# Patient Record
Sex: MALE | Race: BLACK OR AFRICAN AMERICAN | Employment: OTHER | URBAN - METROPOLITAN AREA
[De-identification: names, ages, dates, MRNs, and addresses within clinical notes are randomized per-mention and may not be internally consistent; named-entity substitution may affect disease eponyms.]

---

## 2017-03-11 RX ORDER — DULAGLUTIDE 1.5 MG/.5ML
INJECTION, SOLUTION SUBCUTANEOUS
Qty: 6 ML | Refills: 2 | Status: SHIPPED | OUTPATIENT
Start: 2017-03-11 | End: 2017-11-27 | Stop reason: SDUPTHER

## 2017-03-23 ENCOUNTER — OFFICE VISIT (OUTPATIENT)
Dept: ENDOCRINOLOGY | Age: 62
End: 2017-03-23

## 2017-03-23 VITALS
HEIGHT: 67 IN | DIASTOLIC BLOOD PRESSURE: 71 MMHG | WEIGHT: 217 LBS | BODY MASS INDEX: 34.06 KG/M2 | HEART RATE: 87 BPM | SYSTOLIC BLOOD PRESSURE: 106 MMHG

## 2017-03-23 DIAGNOSIS — E78.2 MIXED HYPERLIPIDEMIA: ICD-10-CM

## 2017-03-23 DIAGNOSIS — I10 ESSENTIAL HYPERTENSION: ICD-10-CM

## 2017-03-23 DIAGNOSIS — E11.21 TYPE 2 DIABETES MELLITUS WITH DIABETIC NEPHROPATHY, WITHOUT LONG-TERM CURRENT USE OF INSULIN (HCC): Primary | ICD-10-CM

## 2017-03-23 LAB — HBA1C MFR BLD HPLC: 7.9 %

## 2017-03-23 RX ORDER — GLIPIZIDE 5 MG/1
5 TABLET ORAL
Qty: 90 TAB | Refills: 3 | Status: SHIPPED | OUTPATIENT
Start: 2017-03-23 | End: 2018-03-05 | Stop reason: SDUPTHER

## 2017-03-23 NOTE — MR AVS SNAPSHOT
Visit Information Date & Time Provider Department Dept. Phone Encounter #  
 3/23/2017  4:10 PM Lexi Andrews, [place] [address] Diabetes and Endocrinology [phone] [id number] Follow-up Instructions Return in about 3 months (around 6/23/2017). Upcoming Health Maintenance Date Due Hepatitis C Screening 1955 Pneumococcal 19-64 Medium Risk (1 of 1 - PPSV23) 10/29/1972 DTaP/Tdap/Td series (1 - Tdap) 10/29/1974 FOBT Q 1 YEAR AGE 50-75 10/29/2003 ZOSTER VACCINE AGE 60> 10/29/2013 INFLUENZA AGE 9 TO ADULT 8/1/2016 LIPID PANEL Q1 3/9/2017 HEMOGLOBIN A1C Q6M 3/13/2017 MICROALBUMIN Q1 6/16/2017 EYE EXAM RETINAL OR DILATED Q1 8/2/2017 FOOT EXAM Q1 12/19/2017 Allergies as of 3/23/2017  Review Complete On: 3/23/2017 By: Lexi Andrews MD  
  
 Severity Noted Reaction Type Reactions Penicillins  03/19/2015    Hives Current Immunizations  Never Reviewed No immunizations on file. Not reviewed this visit You Were Diagnosed With   
  
 Codes Comments Type 2 diabetes mellitus with diabetic nephropathy, without long-term current use of insulin (HCC)    -  Primary ICD-10-CM: E11.21 
ICD-9-CM: 250.40, 583.81 Essential hypertension     ICD-10-CM: I10 
ICD-9-CM: 401.9 Mixed hyperlipidemia     ICD-10-CM: E78.2 ICD-9-CM: 272.2 Vitals BP Pulse Height(growth percentile) Weight(growth percentile) BMI Smoking Status 106/71 87 5' 7\" (1.702 m) 217 lb (98.4 kg) 33.99 kg/m2 Never Smoker Vitals History BMI and BSA Data Body Mass Index Body Surface Area  
 33.99 kg/m 2 2.16 m 2 Preferred Pharmacy Pharmacy Name Phone Kiara Math[facility] [phone] Your Updated Medication List  
  
   
This list is accurate as of: 3/23/17  4:28 PM.  Always use your most recent med list.  
  
  
  
  
 aspirin 325 mg tablet Commonly known as:  ASPIRIN  
 Take 325 mg by mouth daily. BENICAR 20 mg tablet Generic drug:  olmesartan Take 1 Tab by mouth daily. For BP Blood-Glucose Meter Misc Commonly known as:  Meche Herder IQ METER Check blood sugar 3 times daily. E11.21  
  
 CINNAMON 500 mg Cap Generic drug:  cinnamon bark Take  by mouth two (2) times a day. glucose blood VI test strips strip Commonly known as:  CONTOUR NEXT STRIPS Check blood sugar 3 times daily JARDIANCE 10 mg tablet Generic drug:  empagliflozin TAKE 1 TABLET BY MOUTH DAILY  
  
 metFORMIN  mg tablet Commonly known as:  GLUCOPHAGE XR Take 2 Tabs by mouth two (2) times a day. For diabetes  
  
 mometasone 0.1 % topical cream  
Commonly known as:  ELOCON  
  
 multivitamin tablet Commonly known as:  ONE A DAY Take 1 Tab by mouth daily. OMEGA 3-6-9 PO Take  by mouth two (2) times a day. pioglitazone 30 mg tablet Commonly known as:  ACTOS  
1 tablet daily for diabetes  
  
 simvastatin 20 mg tablet Commonly known as:  ZOCOR Take 1 Tab by mouth nightly. For cholesterol TRULICITY 1.5 IB/0.7 mL sub-q pen Generic drug:  dulaglutide INJECT 0.5 ML (1 PEN) UNDER THE SKIN EVERY 7 DAYS  
  
 VIAGRA 100 mg tablet Generic drug:  sildenafil citrate We Performed the Following AMB POC HEMOGLOBIN A1C [74355 CPT(R)] HM DIABETES FOOT EXAM [[facility] Custom] Follow-up Instructions Return in about 3 months (around 6/23/2017). Patient Instructions 1) Stop the CASA AMISTAD 2) Start Glipizide 5mg with Dinner. 3) Continue the Metformin two pills with breakfast and 2 pills with dinner 4) Continue the Trulicity 5.8ER every week. 5) Continue the Actos 30mg daily. Introducing [facility]! Dear Neftali Estrella: Thank you for requesting a [employer] account. Our records indicate that you already have an active [employer] account.   You can access your account anytime at [email or web address] Did you know that you can access your hospital and ER discharge instructions at any time in [employer]? You can also review all of your test results from your hospital stay or ER visit. Additional Information If you have questions, please visit the Frequently Asked Questions section of the [employer] website at [email or web address] Remember, [employer] is NOT to be used for urgent needs. For medical emergencies, dial 911. Now available from your iPhone and Android! Please provide this summary of care documentation to your next provider. Your primary care clinician is listed as Danny Grigsby. If you have any questions after today's visit, please call [phone].

## 2017-03-23 NOTE — PROGRESS NOTES
Chief Complaint   Patient presents with    Diabetes     pcp and pharmacy verified. Eye exam UTD. History of Present Illness: Ryley Adair is a 59 y.o. male here for follow up of diabetes. He was diagnosed with diabetes around 2005. At his last visit in December 2016 his A1C was 6.7% on Metformin XR 1000mg BID, Actos 30mg HS and Jardiance 10mg daily and and Trulicity 7.2BO weekly. He was encouraged to keep up the good work. Pt notes he is still having issues of bad breath and he \"keeps a mint in his mouth. \"    His A1C today was 7.9%. Pt note that his BGs have been averaging in the 130's. Pt is taking Metformin XR 1000mg BID, Actos 30mg HS, Jardiance 5mg daily and Trulicity 8.0GT weekly. He notes that he has cut the Jardiance in half to 5mg because \"It has be running to the bathroom all day\". He has not been having any issues of hypoglycemia. He notes he has been eating more pasterys and ice cream over the last couple of months. He notes the congestion at night his been better. He uses a saline spray at bedtime and has stopped the Afrin. He notes that he still has halitosis. He saw his dentis who told him \"there was nothing wrong\". Pt is eating 3 meals daily  He has breakfast around 7AM, today he had ham and egg sandwich coffee (black with sugar). He does not eat breakfast every day. He has lunch around Noon-1PM, today he had a cheese burger and some fries and water  He has dinner around 6-7PM, last night he had fried fish with fries and broccoli and water. His largest meal of the day is dinner. Exercise consists of very little. Pt is self employed as a . He and his wife walk in the evening when the weather is good. No history of vascular disease. No history of retinopathy or neuropathy, but does have nephropathy (history of positive microalbuminuria and CKD). Last eye exam was August 2016, no retinopathy, (records reviewed).    Pt denies any issues of numbness or burning in his feet. Pt has been to DM education classes in the past.    His lipid panel was at goal in March 2016 and he has been tolerating his regimen of Simvastatin 20mg daily with no issues. Current Outpatient Prescriptions   Medication Sig    glipiZIDE (GLUCOTROL) 5 mg tablet Take 1 Tab by mouth Daily (before dinner).  TRULICITY 1.5 UK/1.5 mL sub-q pen INJECT 0.5 ML (1 PEN) UNDER THE SKIN EVERY 7 DAYS    mometasone (ELOCON) 0.1 % topical cream     BENICAR 20 mg tablet Take 1 Tab by mouth daily. For BP (Patient taking differently: Take 10 mg by mouth daily. For BP)    simvastatin (ZOCOR) 20 mg tablet Take 1 Tab by mouth nightly. For cholesterol    metFORMIN ER (GLUCOPHAGE XR) 500 mg tablet Take 2 Tabs by mouth two (2) times a day. For diabetes    pioglitazone (ACTOS) 30 mg tablet 1 tablet daily for diabetes    VIAGRA 100 mg tablet     Blood-Glucose Meter (ONETOUCH VERIO IQ METER) misc Check blood sugar 3 times daily. E11.21    glucose blood VI test strips (CONTOUR NEXT STRIPS) strip Check blood sugar 3 times daily    multivitamin (ONE A DAY) tablet Take 1 Tab by mouth daily.  OM-3/E/LINOL/ALA/OLEIC/GLA/LIP (OMEGA 3-6-9 PO) Take  by mouth two (2) times a day.  cinnamon bark (CINNAMON) 500 mg cap Take  by mouth two (2) times a day.  aspirin (ASPIRIN) 325 mg tablet Take 325 mg by mouth daily. No current facility-administered medications for this visit. Allergies   Allergen Reactions    Jardiance [Empagliflozin] Other (comments)     Polyuria    Penicillins Hives     Review of Systems:  - Eyes: no blurry vision or double vision  - Cardiovascular: no chest pain  - Respiratory: no shortness of breath  - Musculoskeletal: no myalgias  - Neurological: no numbness/tingling in extremities    Physical Examination:  Blood pressure 106/71, pulse 87, height 5' 7\" (1.702 m), weight 217 lb (98.4 kg).   - General: pleasant, no distress, good eye contact   - Neck: no carotid bruits  - Cardiovascular: regular, normal rate, nl s1 and s2, no m/r/g, 2+ DP pulses   - Respiratory: clear bilaterally  - Integumentary: no edema, no foot ulcers, sensation to monofilament and vibration intact bilaterally  - Psychiatric: normal mood and affect    Data Reviewed:   - His A1C today was 7.9%    Assessment/Plan:   1) DM > His A1C today was 7.9%. Pt notes he has been eating more baked goods and sweets. He also notes he is not doing well with the Jardiance, as he has been having to go to the restroom frequently. Will stop the Jardiance and start pt on Glipizide 5mg with dinner only. Will continue the Metformin, Actos and Trulicity. 2) HTN > His BP is at goal on his current regimen and he is on an ARB for renal protection    3) HLD > His lipid panel in March 2016 was at goal. Pt is tolerating his Simvastatin well. RTC 3 months    Pt voices understanding and agreement with the plan. Follow-up Disposition:  Return in about 3 months (around 6/23/2017).     Copy sent to:  Dr. Mandy Gary

## 2017-03-23 NOTE — PATIENT INSTRUCTIONS
1) Stop the Jardance  2) Start Glipizide 5mg with Dinner. 3) Continue the Metformin two pills with breakfast and 2 pills with dinner  4) Continue the Trulicity 3.4TW every week. 5) Continue the Actos 30mg daily.

## 2017-03-24 ENCOUNTER — TELEPHONE (OUTPATIENT)
Dept: ENDOCRINOLOGY | Age: 62
End: 2017-03-24

## 2017-07-10 RX ORDER — PIOGLITAZONEHYDROCHLORIDE 30 MG/1
TABLET ORAL
Qty: 90 TAB | Refills: 2 | Status: SHIPPED | OUTPATIENT
Start: 2017-07-10 | End: 2018-04-15 | Stop reason: SDUPTHER

## 2017-07-10 RX ORDER — BLOOD-GLUCOSE METER
EACH MISCELLANEOUS
Qty: 1 EACH | Refills: 0 | Status: SHIPPED | OUTPATIENT
Start: 2017-07-10

## 2017-07-13 RX ORDER — OLMESARTAN MEDOXOMIL 20 MG/1
20 TABLET ORAL DAILY
Qty: 90 TAB | Refills: 1
Start: 2017-07-13 | End: 2019-01-11 | Stop reason: SDUPTHER

## 2017-07-31 ENCOUNTER — OFFICE VISIT (OUTPATIENT)
Dept: ENDOCRINOLOGY | Age: 62
End: 2017-07-31

## 2017-07-31 VITALS
BODY MASS INDEX: 36.16 KG/M2 | DIASTOLIC BLOOD PRESSURE: 83 MMHG | SYSTOLIC BLOOD PRESSURE: 119 MMHG | HEIGHT: 66 IN | HEART RATE: 83 BPM | WEIGHT: 225 LBS

## 2017-07-31 DIAGNOSIS — E78.2 MIXED HYPERLIPIDEMIA: ICD-10-CM

## 2017-07-31 DIAGNOSIS — I10 ESSENTIAL HYPERTENSION: ICD-10-CM

## 2017-07-31 DIAGNOSIS — E11.21 TYPE 2 DIABETES MELLITUS WITH DIABETIC NEPHROPATHY, WITHOUT LONG-TERM CURRENT USE OF INSULIN (HCC): Primary | ICD-10-CM

## 2017-07-31 NOTE — PROGRESS NOTES
No chief complaint on file. History of Present Illness: Leonel Anguiano is a 59 y.o. male here for follow up of diabetes. He was diagnosed with diabetes around 2005. At his last visit in April 2017 his A1C was 7.9% on Metformin XR 1000mg BID, Actos 30mg HS and Jardiance 10mg daily and and Trulicity 0.3QI weekly. He was encouraged to keep up the good work. He was having polyuria and wanted to stop the Jardiance so we stopped that and started him on Glipizide 5mg with dinner only. Pt notes his PCP started him on a PPI and it has helped with her GERD and the bad breath issue. Pt has not been checking his BGs. Pt is taking Metformin XR 1000mg BID, Actos 30mg HS, Glipizide 5mg with dinner and Trulicity 1.4ZD weekly. Pt is eating 3 meals daily  He has breakfast around 7AM, today he had ham and egg sandwich coffee (black with sugar) and 2 apple pies. He has lunch around Noon-1PM, today he had ham and turkey sandwich, no side items and water. He has dinner around 6-7PM, last night he had a hamburger, french fries and water. His largest meal of the day is dinner. Exercise consists of very little. Pt is self employed as a . He and his wife walk in the evening when the weather is good. No history of vascular disease. No history of retinopathy or neuropathy, but does have nephropathy (history of positive microalbuminuria and CKD). Last eye exam was August 2016, no retinopathy, (records reviewed). Pt denies any issues of numbness or burning in his feet. His lipid panel was at goal in March 2016 and he has been tolerating his regimen of Simvastatin 20mg daily with no issues. Current Outpatient Prescriptions   Medication Sig    metFORMIN ER (GLUCOPHAGE XR) 500 mg tablet TAKE 2 TABLETS TWICE A DAY FOR DIABETES    simvastatin (ZOCOR) 20 mg tablet TAKE 1 TABLET NIGHTLY FOR CHOLESTEROL    olmesartan (BENICAR) 20 mg tablet Take 1 Tab by mouth daily.  (replaces brand Benicar)   Chyna Perkins VERIO IQ METER misc USE TO CHECK BLOOD SUGAR THREE TIMES A DAY    pioglitazone (ACTOS) 30 mg tablet TAKE 1 TABLET DAILY FOR DIABETES    glipiZIDE (GLUCOTROL) 5 mg tablet Take 1 Tab by mouth Daily (before dinner).  TRULICITY 1.5 VD/8.0 mL sub-q pen INJECT 0.5 ML (1 PEN) UNDER THE SKIN EVERY 7 DAYS    mometasone (ELOCON) 0.1 % topical cream     VIAGRA 100 mg tablet     glucose blood VI test strips (CONTOUR NEXT STRIPS) strip Check blood sugar 3 times daily    multivitamin (ONE A DAY) tablet Take 1 Tab by mouth daily.  OM-3/E/LINOL/ALA/OLEIC/GLA/LIP (OMEGA 3-6-9 PO) Take  by mouth two (2) times a day.  cinnamon bark (CINNAMON) 500 mg cap Take  by mouth two (2) times a day.  aspirin (ASPIRIN) 325 mg tablet Take 325 mg by mouth daily. No current facility-administered medications for this visit. Allergies   Allergen Reactions    Jardiance [Empagliflozin] Other (comments)     Polyuria    Penicillins Hives     Review of Systems:  - Eyes: no blurry vision or double vision  - Cardiovascular: no chest pain  - Respiratory: no shortness of breath  - Musculoskeletal: no myalgias  - Neurological: no numbness/tingling in extremities    Physical Examination:  Blood pressure 119/83, pulse 83, height 5' 6\" (1.676 m), weight 225 lb (102.1 kg). - General: pleasant, no distress, good eye contact   - Neck: no carotid bruits  - Cardiovascular: regular, normal rate, nl s1 and s2, no m/r/g, 2+ DP pulses   - Respiratory: clear bilaterally  - Integumentary: no edema, no foot ulcers, sensation to monofilament and vibration intact bilaterally  - Psychiatric: normal mood and affect    Data Reviewed:   -none    Assessment/Plan:   1) DM > Will check an A1C today. Pt given copy of \"Daily Diabetes Meal Planning Guide\" and educated about the \"[employer] dinner plate\", reading food labels and which foods have the highest carbohydrates.   Pt instructed to limit her carbohydrates to 45-60 grams with meals and 15 grams or less with snacks (but to limit snacks). If his A1C is still above goal we may need to increase his Glipizide. 2) HTN > His BP is at goal on his current regimen and he is on an ARB for renal protection    3) HLD > His lipid panel in March 2016 was at goal. Pt is tolerating his Simvastatin well. Will check a CMP and lipid panel today. RTC 3 months    Pt voices understanding and agreement with the plan. Follow-up Disposition:  Return in about 3 months (around 10/31/2017).     Copy sent to:  Dr. Esther Jha

## 2017-08-01 LAB
ALBUMIN SERPL-MCNC: 4.1 G/DL (ref 3.6–4.8)
ALBUMIN/CREAT UR: 306.3 MG/G CREAT (ref 0–30)
ALBUMIN/GLOB SERPL: 1.6 {RATIO} (ref 1.2–2.2)
ALP SERPL-CCNC: 82 IU/L (ref 39–117)
ALT SERPL-CCNC: 29 IU/L (ref 0–44)
AST SERPL-CCNC: 18 IU/L (ref 0–40)
BILIRUB SERPL-MCNC: 0.3 MG/DL (ref 0–1.2)
BUN SERPL-MCNC: 14 MG/DL (ref 8–27)
BUN/CREAT SERPL: 14 (ref 10–24)
CALCIUM SERPL-MCNC: 8.9 MG/DL (ref 8.6–10.2)
CHLORIDE SERPL-SCNC: 103 MMOL/L (ref 96–106)
CHOLEST SERPL-MCNC: 131 MG/DL (ref 100–199)
CO2 SERPL-SCNC: 25 MMOL/L (ref 18–29)
CREAT SERPL-MCNC: 1.02 MG/DL (ref 0.76–1.27)
CREAT UR-MCNC: 145.5 MG/DL
EST. AVERAGE GLUCOSE BLD GHB EST-MCNC: 174 MG/DL
GLOBULIN SER CALC-MCNC: 2.5 G/DL (ref 1.5–4.5)
GLUCOSE SERPL-MCNC: 140 MG/DL (ref 65–99)
HBA1C MFR BLD: 7.7 % (ref 4.8–5.6)
HDLC SERPL-MCNC: 62 MG/DL
INTERPRETATION, 910389: NORMAL
LDLC SERPL CALC-MCNC: 49 MG/DL (ref 0–99)
LDLC SERPL DIRECT ASSAY-MCNC: 66 MG/DL (ref 0–99)
Lab: NORMAL
MICROALBUMIN UR-MCNC: 445.6 UG/ML
POTASSIUM SERPL-SCNC: 4.3 MMOL/L (ref 3.5–5.2)
PROT SERPL-MCNC: 6.6 G/DL (ref 6–8.5)
SODIUM SERPL-SCNC: 140 MMOL/L (ref 134–144)
TRIGL SERPL-MCNC: 99 MG/DL (ref 0–149)
VLDLC SERPL CALC-MCNC: 20 MG/DL (ref 5–40)

## 2017-08-01 NOTE — PROGRESS NOTES
Pt instructed to continue his Simvastatin 20mg daily. I will either increase his Glipizide to 5mg BID or restart the Jardiance 10mg daily. I will give pt the option.

## 2017-08-03 ENCOUNTER — HOSPITAL ENCOUNTER (OUTPATIENT)
Dept: GENERAL RADIOLOGY | Age: 62
Discharge: HOME | End: 2017-08-03
Attending: SPECIALIST
Payer: COMMERCIAL

## 2017-08-03 DIAGNOSIS — K44.9 DIAPHRAGMATIC HERNIA WITHOUT MENTION OF OBSTRUCTION OR GANGRENE: ICD-10-CM

## 2017-08-03 DIAGNOSIS — R19.6 BAD BREATH: ICD-10-CM

## 2017-08-03 DIAGNOSIS — K21.9 ESOPHAGEAL REFLUX: ICD-10-CM

## 2017-08-03 PROCEDURE — 74220 X-RAY XM ESOPHAGUS 1CNTRST: CPT

## 2017-08-07 ENCOUNTER — TELEPHONE (OUTPATIENT)
Dept: ENDOCRINOLOGY | Age: 62
End: 2017-08-07

## 2017-08-07 NOTE — TELEPHONE ENCOUNTER
Labs reviewed,  A1c level is 7.7%, minimally changed from prior 7.9%. Cholesterol looks great, LDL cholesterol is 66. Urine protein is elevated at 306, may improve with good blood pressure and diabetes control. Kidney and liver function look stable. Thanks,  Avila Rea.  [address] [facility]  [address] [place] [address]

## 2017-08-07 NOTE — TELEPHONE ENCOUNTER
Patient's wife, Delilah Bishop, called to get her 's lab results. She can be reached at:  [phone].

## 2017-08-21 RX ORDER — BLOOD SUGAR DIAGNOSTIC
STRIP MISCELLANEOUS
Qty: 300 STRIP | Refills: 11 | Status: SHIPPED | OUTPATIENT
Start: 2017-08-21 | End: 2019-01-11 | Stop reason: SDUPTHER

## 2017-10-03 ENCOUNTER — HOSPITAL ENCOUNTER (OUTPATIENT)
Dept: NUCLEAR MEDICINE | Age: 62
Discharge: HOME | End: 2017-10-03
Attending: SPECIALIST
Payer: COMMERCIAL

## 2017-10-03 DIAGNOSIS — R19.6 BAD BREATH: ICD-10-CM

## 2017-10-03 DIAGNOSIS — K44.9 DIAPHRAGMATIC HERNIA: ICD-10-CM

## 2017-10-03 DIAGNOSIS — K21.9 ESOPHAGEAL REFLUX: ICD-10-CM

## 2017-10-03 PROCEDURE — 78264 GASTRIC EMPTYING IMG STUDY: CPT

## 2017-10-24 NOTE — TELEPHONE ENCOUNTER
Patient's wife, Lauryn Dave, called to ask if you can contact [facility] for a 90-day supply of Jardiance for [place]? Lauryn Dave can be reached at:  [phone].

## 2017-10-25 NOTE — TELEPHONE ENCOUNTER
Per OV 7/31/17: Pt instructed to continue his Simvastatin 20mg daily. I will either increase his Glipizide to 5mg BID or restart the Jardiance 10mg daily. I will give pt the option.  ----------  Rx sent for Jardiance 10 mg to [facility].

## 2017-11-22 ENCOUNTER — OFFICE VISIT (OUTPATIENT)
Dept: ENDOCRINOLOGY | Age: 62
End: 2017-11-22

## 2017-11-22 VITALS
SYSTOLIC BLOOD PRESSURE: 130 MMHG | WEIGHT: 221.1 LBS | DIASTOLIC BLOOD PRESSURE: 88 MMHG | HEIGHT: 66 IN | HEART RATE: 88 BPM | BODY MASS INDEX: 35.53 KG/M2

## 2017-11-22 DIAGNOSIS — E78.2 MIXED HYPERLIPIDEMIA: ICD-10-CM

## 2017-11-22 DIAGNOSIS — I10 ESSENTIAL HYPERTENSION: ICD-10-CM

## 2017-11-22 DIAGNOSIS — E11.21 TYPE 2 DIABETES MELLITUS WITH DIABETIC NEPHROPATHY, WITHOUT LONG-TERM CURRENT USE OF INSULIN (HCC): Primary | ICD-10-CM

## 2017-11-22 RX ORDER — LANSOPRAZOLE 30 MG/1
CAPSULE, DELAYED RELEASE ORAL DAILY
COMMUNITY
Start: 2017-11-15

## 2017-11-22 NOTE — PROGRESS NOTES
Chief Complaint   Patient presents with    Diabetes     pcp and pharmacy verified     History of Present Illness: Yao Blackwell is a 57 y.o. male here for follow up of diabetes. He was diagnosed with diabetes around 2005. At his last visit in July 2017 his A1C was 7.7% on Metformin XR 1000mg BID, Actos 30mg HS Glipizide 5mg with dinner and Trulicity 5.3KM weekly. I recommended he retry the Jardiance 10mg daily and continue his other regimen. Pt notes that his BGs tend to run in the 150's range. He notes that he has stopped using sugar in his coffee. Pt is taking Metformin XR 1000mg BID, Actos 30mg HS, Glipizide 5mg with dinner and Trulicity 7.7AD weekly. Pt is eating 3 meals daily  He has breakfast around 630AM, today he had ham sandwich and coffee (black with no sugar). He will occasionally have a snack in the mid-morning. He has lunch around Noon-1PM, today he had a PB&J sandwich no side items and water, then he had two pieces of chicken when he got home. He has dinner around 6-7PM, last night he had chicken and butter beans and water. His largest meal of the day is dinner. Exercise consists of very little. Pt is self employed as a . He and his wife walk in the evening when the weather is good. He is not walking as much with the cold weather. No history of vascular disease. No history of retinopathy or neuropathy, but does have nephropathy (history of positive microalbuminuria and CKD). Last eye exam was August 2017, no retinopathy, (records reviewed). Pt denies any issues of numbness or burning in his feet. Pt saw Dr. Ignacio Clemente of GI, he had EGD and colonoscopy and gastric emptying stomach. He was found to have slow emptying of his stomach (gastroparesis) and a hiatal hernia. He was instructed to sleep with a wedge pillow and was started on Prevacid. Since that time he has been having less GERD.      His lipid panel was at goal in July 2017 and he has been tolerating his regimen of Simvastatin 20mg daily with no issues. Current Outpatient Prescriptions   Medication Sig    ONETOUCH VERIO strip CHECK BLOOD SUGAR THREE TIMES A DAY    metFORMIN ER (GLUCOPHAGE XR) 500 mg tablet TAKE 2 TABLETS TWICE A DAY FOR DIABETES    simvastatin (ZOCOR) 20 mg tablet TAKE 1 TABLET NIGHTLY FOR CHOLESTEROL    olmesartan (BENICAR) 20 mg tablet Take 1 Tab by mouth daily. (replaces brand Benicar) (Patient taking differently: Take 10 mg by mouth daily. (replaces brand Benicar))    ONETOUCH VERIO IQ METER misc USE TO CHECK BLOOD SUGAR THREE TIMES A DAY    pioglitazone (ACTOS) 30 mg tablet TAKE 1 TABLET DAILY FOR DIABETES    glipiZIDE (GLUCOTROL) 5 mg tablet Take 1 Tab by mouth Daily (before dinner).  TRULICITY 1.5 XD/1.9 mL sub-q pen INJECT 0.5 ML (1 PEN) UNDER THE SKIN EVERY 7 DAYS    VIAGRA 100 mg tablet     multivitamin (ONE A DAY) tablet Take 1 Tab by mouth daily.  OM-3/E/LINOL/ALA/OLEIC/GLA/LIP (OMEGA 3-6-9 PO) Take  by mouth two (2) times a day.  cinnamon bark (CINNAMON) 500 mg cap Take  by mouth two (2) times a day.  lansoprazole (PREVACID) 30 mg capsule     mometasone (ELOCON) 0.1 % topical cream      No current facility-administered medications for this visit. Allergies   Allergen Reactions    Jardiance [Empagliflozin] Other (comments)     Polyuria    Penicillins Hives     Review of Systems:  - Eyes: no blurry vision or double vision  - Cardiovascular: no chest pain  - Respiratory: no shortness of breath  - Musculoskeletal: no myalgias  - Neurological: no numbness/tingling in extremities    Physical Examination:  Blood pressure 130/88, pulse 88, height 5' 6\" (1.676 m), weight 221 lb 1.6 oz (100.3 kg).   - General: pleasant, no distress, good eye contact   - Neck: no carotid bruits  - Cardiovascular: regular, normal rate, nl s1 and s2, no m/r/g, 2+ DP pulses   - Respiratory: clear bilaterally  - Integumentary: no edema, no foot ulcers, sensation to monofilament and vibration intact bilaterally  - Psychiatric: normal mood and affect    Data Reviewed:   -none    Assessment/Plan:   1) DM > Pt reports that since he stopped using sugar in his coffee and note eating as many sweets, his BGs have been better. Will check an A1C today. If his A1C is still above goal we may need to increase his Glipizide. 2) HTN > His BP is at goal on his current regimen and he is on an ARB for renal protection    3) HLD > His lipid panel in July 2017 was ago goal. Pt is tolerating his Simvastatin well. RTC 3 months    Pt voices understanding and agreement with the plan. We spent 40 minutes of face to face time together and > 50% of the time was spent in counseling on the above issues. Follow-up Disposition:  Return in about 3 months (around 2/22/2018).     Copy sent to:  Dr. Guy Cain

## 2017-11-23 LAB
ALBUMIN SERPL-MCNC: 4.1 G/DL (ref 3.6–4.8)
BUN SERPL-MCNC: 14 MG/DL (ref 8–27)
BUN/CREAT SERPL: 11 (ref 10–24)
CALCIUM SERPL-MCNC: 9.2 MG/DL (ref 8.6–10.2)
CHLORIDE SERPL-SCNC: 103 MMOL/L (ref 96–106)
CO2 SERPL-SCNC: 24 MMOL/L (ref 18–29)
CREAT SERPL-MCNC: 1.23 MG/DL (ref 0.76–1.27)
EST. AVERAGE GLUCOSE BLD GHB EST-MCNC: 183 MG/DL
GFR SERPLBLD CREATININE-BSD FMLA CKD-EPI: 62 ML/MIN/1.73
GFR SERPLBLD CREATININE-BSD FMLA CKD-EPI: 71 ML/MIN/1.73
GLUCOSE SERPL-MCNC: 122 MG/DL (ref 65–99)
HBA1C MFR BLD: 8 % (ref 4.8–5.6)
PHOSPHATE SERPL-MCNC: 3 MG/DL (ref 2.5–4.5)
POTASSIUM SERPL-SCNC: 4.2 MMOL/L (ref 3.5–5.2)
SODIUM SERPL-SCNC: 142 MMOL/L (ref 134–144)

## 2017-11-28 RX ORDER — DULAGLUTIDE 1.5 MG/.5ML
INJECTION, SOLUTION SUBCUTANEOUS
Qty: 6 ML | Refills: 3 | Status: SHIPPED | OUTPATIENT
Start: 2017-11-28 | End: 2018-10-26 | Stop reason: SDUPTHER

## 2017-12-08 ENCOUNTER — TELEPHONE (OUTPATIENT)
Dept: ENDOCRINOLOGY | Age: 62
End: 2017-12-08

## 2017-12-08 NOTE — TELEPHONE ENCOUNTER
Patient has been read the [facility] message from 11/22/17. He expressed understanding. He states that he will send his BS readings in 3 weeks.

## 2018-03-01 ENCOUNTER — OFFICE VISIT (OUTPATIENT)
Dept: ENDOCRINOLOGY | Age: 63
End: 2018-03-01

## 2018-03-01 VITALS
HEIGHT: 66 IN | RESPIRATION RATE: 14 BRPM | DIASTOLIC BLOOD PRESSURE: 82 MMHG | SYSTOLIC BLOOD PRESSURE: 110 MMHG | OXYGEN SATURATION: 97 % | HEART RATE: 96 BPM | BODY MASS INDEX: 35.17 KG/M2 | WEIGHT: 218.8 LBS

## 2018-03-01 DIAGNOSIS — E78.2 MIXED HYPERLIPIDEMIA: ICD-10-CM

## 2018-03-01 DIAGNOSIS — I10 ESSENTIAL HYPERTENSION: ICD-10-CM

## 2018-03-01 DIAGNOSIS — E11.21 TYPE 2 DIABETES MELLITUS WITH DIABETIC NEPHROPATHY, WITHOUT LONG-TERM CURRENT USE OF INSULIN (HCC): Primary | ICD-10-CM

## 2018-03-01 NOTE — PROGRESS NOTES
Chief Complaint   Patient presents with    Diabetes     Follow-up     History of Present Illness: Jean-Pierre Peña is a 57 y.o. male here for follow up of diabetes. He was diagnosed with diabetes around 2005. At his last visit in November 2017 his A1C was 8.0% on Metformin XR 1000mg BID, Actos 30mg HS Glipizide 5mg with dinner and Trulicity 7.3HE weekly. Pt was to send me some BG readings, in 2-3 weeks, but he did not. \"I have been bad\". His wife notes he has been snacking more. Pt is currently taking Metformin XR 1000mg BID, Actos 30mg HS, Glipizide 5mg with dinner and Trulicity 9.8WR weekly. He is checking his BGs once per week, in the morning. He notes his BGs are running in the 140's. Pt is eating 3 meals daily  He has breakfast around 630AM, today he had a PB&J sandwich. He will occasionally have a snack in the mid-morning. He has lunch around Noon-1PM, today he had a chicken sandwich. He has dinner around 6-7PM, last night he had chicken and butter beans and water. His largest meal of the day is dinner. His wife is eating ice cream and cookies in the evening. Exercise consists of very little. Pt is self employed as a . He and his wife walk in the evening when the weather is good. He is not walking as much with the cold weather. No history of vascular disease. No history of retinopathy or neuropathy, but does have nephropathy (history of positive microalbuminuria and CKD). Last eye exam was August 2017, no retinopathy, (records reviewed). Pt denies any issues of numbness or burning in his feet. Pt saw Dr. Lola Barnett of GI, he had EGD and colonoscopy and gastric emptying stomach. He was found to have slow emptying of his stomach (gastroparesis) and a hiatal hernia. He was instructed to sleep with a wedge pillow and was started on Prevacid. Since that time he has been having less GERD.      His lipid panel was at goal in July 2017 and he has been tolerating his regimen of Simvastatin 20mg daily with no issues. Current Outpatient Prescriptions   Medication Sig    TRULICITY 1.5 ZC/5.3 mL sub-q pen INJECT 0.5 ML (1 PEN) UNDER THE SKIN EVERY 7 DAYS    lansoprazole (PREVACID) 30 mg capsule     ONETOUCH VERIO strip CHECK BLOOD SUGAR THREE TIMES A DAY    metFORMIN ER (GLUCOPHAGE XR) 500 mg tablet TAKE 2 TABLETS TWICE A DAY FOR DIABETES    simvastatin (ZOCOR) 20 mg tablet TAKE 1 TABLET NIGHTLY FOR CHOLESTEROL    olmesartan (BENICAR) 20 mg tablet Take 1 Tab by mouth daily. (replaces brand Benicar) (Patient taking differently: Take 10 mg by mouth daily. (replaces brand Benicar))    ONETOUCH VERIO IQ METER misc USE TO CHECK BLOOD SUGAR THREE TIMES A DAY    pioglitazone (ACTOS) 30 mg tablet TAKE 1 TABLET DAILY FOR DIABETES    glipiZIDE (GLUCOTROL) 5 mg tablet Take 1 Tab by mouth Daily (before dinner).  mometasone (ELOCON) 0.1 % topical cream     VIAGRA 100 mg tablet     multivitamin (ONE A DAY) tablet Take 1 Tab by mouth daily.  OM-3/E/LINOL/ALA/OLEIC/GLA/LIP (OMEGA 3-6-9 PO) Take  by mouth two (2) times a day.  cinnamon bark (CINNAMON) 500 mg cap Take  by mouth two (2) times a day. No current facility-administered medications for this visit. Allergies   Allergen Reactions    Jardiance [Empagliflozin] Other (comments)     Polyuria    Penicillins Hives     Review of Systems:  - Eyes: no blurry vision or double vision  - Cardiovascular: no chest pain  - Respiratory: no shortness of breath  - Musculoskeletal: no myalgias  - Neurological: no numbness/tingling in extremities    Physical Examination:  Blood pressure 110/82, pulse 96, resp. rate 14, height 5' 6\" (1.676 m), weight 218 lb 12.8 oz (99.2 kg), SpO2 97 %.   - General: pleasant, no distress, good eye contact   - Neck: no carotid bruits  - Cardiovascular: regular, normal rate, nl s1 and s2, no m/r/g, 2+ DP pulses   - Respiratory: clear bilaterally  - Integumentary: no edema, no foot ulcers, sensation to monofilament and vibration intact bilaterally  - Psychiatric: normal mood and affect    Data Reviewed:   -none    Assessment/Plan:   1) DM > Pt's wife notes he has been snacking more and using sugar in his tea and coffee. Will check an A1C today. If his A1C is still above goal we may need to increase his Glipizide. Pt to check his BGs twice per day. 2) HTN > His BP is at goal on his current regimen and he is on an ARB for renal protection    3) HLD > His lipid panel in July 2017 was ago goal. Pt is tolerating his Simvastatin well. RTC 3 months    Pt voices understanding and agreement with the plan. Follow-up Disposition:  Return in about 3 months (around 6/1/2018).     Copy sent to:  Dr. Logan Dumont

## 2018-03-01 NOTE — PROGRESS NOTES
Herlinda Chavarria is a 57 y.o. male      Chief Complaint   Patient presents with    Diabetes     Follow-up       1. Have you been to the ER, urgent care clinic since your last visit? Hospitalized since your last visit? No    2. Have you seen or consulted any other health care providers outside of the [address] [place] [address] since your last visit? Include any pap smears or colon screening.  No

## 2018-03-02 LAB
EST. AVERAGE GLUCOSE BLD GHB EST-MCNC: 189 MG/DL
HBA1C MFR BLD: 8.2 % (ref 4.8–5.6)

## 2018-03-28 ENCOUNTER — TELEPHONE (OUTPATIENT)
Dept: ENDOCRINOLOGY | Age: 63
End: 2018-03-28

## 2018-03-29 NOTE — TELEPHONE ENCOUNTER
Spoke with patient. Advised patient about the unread MyChart message. Patient states that he will read the message.

## 2018-04-16 RX ORDER — METFORMIN HYDROCHLORIDE 500 MG/1
TABLET, EXTENDED RELEASE ORAL
Qty: 360 TAB | Refills: 2 | Status: SHIPPED | OUTPATIENT
Start: 2018-04-16 | End: 2018-12-14 | Stop reason: SDUPTHER

## 2018-04-16 RX ORDER — PIOGLITAZONEHYDROCHLORIDE 30 MG/1
TABLET ORAL
Qty: 90 TAB | Refills: 2 | Status: SHIPPED | OUTPATIENT
Start: 2018-04-16 | End: 2018-12-14 | Stop reason: SDUPTHER

## 2018-04-30 RX ORDER — SIMVASTATIN 20 MG/1
TABLET, FILM COATED ORAL
Qty: 90 TAB | Refills: 2 | Status: SHIPPED | OUTPATIENT
Start: 2018-04-30 | End: 2019-01-30 | Stop reason: SDUPTHER

## 2018-06-27 ENCOUNTER — OFFICE VISIT (OUTPATIENT)
Dept: ENDOCRINOLOGY | Age: 63
End: 2018-06-27

## 2018-06-27 VITALS
SYSTOLIC BLOOD PRESSURE: 115 MMHG | HEIGHT: 66 IN | DIASTOLIC BLOOD PRESSURE: 67 MMHG | BODY MASS INDEX: 34.55 KG/M2 | WEIGHT: 215 LBS | HEART RATE: 98 BPM

## 2018-06-27 DIAGNOSIS — I10 ESSENTIAL HYPERTENSION: ICD-10-CM

## 2018-06-27 DIAGNOSIS — E11.21 TYPE 2 DIABETES MELLITUS WITH DIABETIC NEPHROPATHY, WITHOUT LONG-TERM CURRENT USE OF INSULIN (HCC): Primary | ICD-10-CM

## 2018-06-27 DIAGNOSIS — E78.2 MIXED HYPERLIPIDEMIA: ICD-10-CM

## 2018-06-27 RX ORDER — ASPIRIN 81 MG/1
TABLET ORAL DAILY
COMMUNITY

## 2018-06-27 NOTE — PATIENT INSTRUCTIONS
Phentermine (By mouth)   Phentermine (FEN-ter-meen)  Helps you lose weight when used for a short time. Brand Name(s): Adipex-P, Lomaira   There may be other brand names for this medicine. When This Medicine Should Not Be Used: This medicine is not right for everyone. Do not use it if you had an allergic reaction to phentermine or similar medicines, or if you are pregnant. How to Use This Medicine:   Dissolving Tablet, Capsule, Long Acting Capsule, Tablet, Dissolving Tablet  · Your doctor will tell you how much medicine to use. Do not use more than directed. · This medicine is not for long-term use. · To avoid trouble sleeping, always take this medicine in the morning and never at bedtime or late in the evening. ¨ Take the capsule 2 hours after breakfast.  ¨ Take the extended-release capsule before breakfast.  ¨ Take the disintegrating tablet in the morning, with or without food. ¨ Take the phentermine tablet before breakfast or 1 to 2 hours after breakfast.  ¨ Take Lomaira tablet 30 minutes before meals. · Swallow the extended-release capsule whole. Do not crush, break, or chew it. · If you are using the disintegrating tablet, make sure your hands are dry before you handle the tablet. Place the tablet on your tongue. It should melt quickly. After the tablet has melted, swallow or take a drink of water. · Tablet: Swallow whole. Do not crush, break, or chew it. · Carefully follow your doctor's instructions about any special diet. · Missed dose: Take a dose as soon as you remember. If it is almost time for your next dose, wait until then and take a regular dose. Do not take extra medicine to make up for a missed dose. · Store the medicine in a closed container at room temperature, away from heat, moisture, and direct light. Drugs and Foods to Avoid:   Ask your doctor or pharmacist before using any other medicine, including over-the-counter medicines, vitamins, and herbal products.   · Do not use this medicine and an MAO inhibitor (MAOI) within 14 days of each other. · Some medicines can affect how phentermine works. Tell your doctor if you are using any of the following:  ¨ Amphetamine medicine (including dextroamphetamine, methamphetamine)  ¨ Diet pills  ¨ Insulin or diabetes medicine  ¨ Medicine to treat depression (including fluoxetine, fluvoxamine, paroxetine, sertraline)  · Do not drink alcohol while you are using this medicine. Warnings While Using This Medicine:   · It is not safe to take this medicine during pregnancy. It could harm an unborn baby. Tell your doctor right away if you become pregnant. · Tell your doctor if you are breastfeeding, or if you have kidney disease, diabetes, glaucoma, congestive heart failure, heart or blood vessel disease, high blood pressure, overactive thyroid, or a history of stroke or drug abuse. Tell your doctor if have allergies to aspirin or tartrazine. · This medicine may cause the following problems:  ¨ Primary pulmonary hypertension (a serious lung problem)  ¨ Heart valve disease  ¨ Changes in blood sugar levels  · This medicine may make you dizzy or drowsy. Do not drive or do anything else that could be dangerous until you know how this medicine affects you. · This medicine can be habit-forming. Do not use more than your prescribed dose. Call your doctor if you think your medicine is not working. · Do not stop using this medicine suddenly. Your doctor will need to slowly decrease your dose before you stop it completely. · Your doctor will check your progress and the effects of this medicine at regular visits. Keep all appointments. · Keep all medicine out of the reach of children. Never share your medicine with anyone.   Possible Side Effects While Using This Medicine:   Call your doctor right away if you notice any of these side effects:  · Allergic reaction: Itching or hives, swelling in your face or hands, swelling or tingling in your mouth or throat, chest tightness, trouble breathing  · Chest pain, fainting, trouble breathing  · Fast, slow, pounding, or uneven heartbeat  · Seizures or tremors  · Severe headache  · Swelling of your feet or lower legs  If you notice these less serious side effects, talk with your doctor:   · Changes in sex drive  · Dizziness, drowsiness, mild headache  · Dry mouth or a bad taste in your mouth  · Nausea, vomiting, diarrhea, constipation, stomach cramps  · Restlessness or nervousness, trouble sleeping  If you notice other side effects that you think are caused by this medicine, tell your doctor. Call your doctor for medical advice about side effects. You may report side effects to FDA at 4-331-FDA-7602  © 2017 [address] Information is for End User's use only and may not be sold, redistributed or otherwise used for commercial purposes. The above information is an  only. It is not intended as medical advice for individual conditions or treatments. Talk to your doctor, nurse or pharmacist before following any medical regimen to see if it is safe and effective for you.

## 2018-06-27 NOTE — PROGRESS NOTES
Chief Complaint   Patient presents with    Diabetes     pcp and pharmacy verified. Eye exam UTD   Records since last visit reviewed. History of Present Illness: Leonel Anguiano is a 57 y.o. male here for follow up of diabetes. He was diagnosed with diabetes around 2005. At his last visit in March 2018 his A1C was 8.2% on Metformin XR 1000mg BID, Actos 30mg HS Glipizide 5mg with dinner and Trulicity 2.4MI weekly. I instructed him to increase his Glipizide to 5mg BID. He and his wife just returned from vacation in [place]. Pt is currently taking Metformin XR 1000mg BID, Actos 30mg HS, Glipizide 5mg BID and Trulicity 5.8CW weekly. He is not checking his BGs. Pt is eating 3 meals daily  He has breakfast around 630AM, today he had an egg and canales and coffee (sugar). He will occasionally have a snack in the mid-morning. He has lunch around Noon-1PM, today he had a chicken sandwich (baked), PB&J sandwich and cheese crackers and water. He has dinner around 6-7PM, last night he had chicken strips and a slice of bread and water. His largest meal of the day is dinner. His wife is eating ice cream and cookies in the evening. Exercise consists of very little. Pt is self employed as a . He and his wife walk in the evening when the weather is good. He is not walking as much with the cold weather. No history of vascular disease. No history of retinopathy or neuropathy, but does have nephropathy (history of positive microalbuminuria and CKD). Last eye exam was August 2017, no retinopathy, (records reviewed). Pt denies any issues of numbness or burning in his feet. Pt saw Dr. Arnold High of GI, he had EGD and colonoscopy and gastric emptying stomach. He was found to have slow emptying of his stomach (gastroparesis) and a hiatal hernia. He was instructed to sleep with a wedge pillow and was started on Prevacid. Since that time he has been having less GERD.      His lipid panel was at goal in July 2017 and he has been tolerating his regimen of Simvastatin 20mg daily with no issues. Current Outpatient Prescriptions   Medication Sig    aspirin delayed-release 81 mg tablet Take  by mouth daily. Takes 2 daily    simvastatin (ZOCOR) 20 mg tablet TAKE 1 TABLET NIGHTLY FOR CHOLESTEROL    metFORMIN ER (GLUCOPHAGE XR) 500 mg tablet TAKE 2 TABLETS TWICE A DAY FOR DIABETES    pioglitazone (ACTOS) 30 mg tablet TAKE 1 TABLET DAILY FOR DIABETES    glipiZIDE (GLUCOTROL) 5 mg tablet Take 1 Tab by mouth two (2) times a day. One with breakfast and one with dinner    TRULICITY 1.5 NK/5.3 mL sub-q pen INJECT 0.5 ML (1 PEN) UNDER THE SKIN EVERY 7 DAYS    lansoprazole (PREVACID) 30 mg capsule as needed.  ONETOUCH VERIO strip CHECK BLOOD SUGAR THREE TIMES A DAY    olmesartan (BENICAR) 20 mg tablet Take 1 Tab by mouth daily. (replaces brand Benicar) (Patient taking differently: Take 10 mg by mouth daily. (replaces brand Benicar))    ONETOUCH VERIO IQ METER misc USE TO CHECK BLOOD SUGAR THREE TIMES A DAY    mometasone (ELOCON) 0.1 % topical cream     VIAGRA 100 mg tablet     multivitamin (ONE A DAY) tablet Take 1 Tab by mouth daily.  OM-3/E/LINOL/ALA/OLEIC/GLA/LIP (OMEGA 3-6-9 PO) Take  by mouth two (2) times a day.  cinnamon bark (CINNAMON) 500 mg cap Take  by mouth two (2) times a day. No current facility-administered medications for this visit. Allergies   Allergen Reactions    Jardiance [Empagliflozin] Other (comments)     Polyuria    Penicillins Hives     Review of Systems:  - Eyes: no blurry vision or double vision  - Cardiovascular: no chest pain  - Respiratory: no shortness of breath  - Musculoskeletal: no myalgias  - Neurological: no numbness/tingling in extremities    Physical Examination:  Blood pressure 115/67, pulse 98, height 5' 6\" (1.676 m), weight 215 lb (97.5 kg).   - General: pleasant, no distress, good eye contact   - Neck: no carotid bruits  - Cardiovascular: regular, normal rate, nl s1 and s2, no m/r/g, 2+ DP pulses   - Respiratory: clear bilaterally  - Integumentary: no edema, no foot ulcers, sensation to monofilament and vibration intact bilaterally  - Psychiatric: normal mood and affect    Diabetic foot exam:     Left Foot:   Visual Exam: normal    Pulse DP: 2+ (normal)   Filament test: normal sensation    Vibratory sensation: normal      Right Foot:   Visual Exam: normal    Pulse DP: 2+ (normal)   Filament test: normal sensation    Vibratory sensation: normal    Data Reviewed:   -none    Assessment/Plan:   1) DM > Pt has not been checking his BGs. Will check an A1C today. If his A1C is still above goal we may need to increase his Glipizide. Pt to check his BGs twice per day. Additionally, pt's wife was asking about something to help with his appetite. Pt notes that \"in the afternoon and evening I can not get enough to eat\". We discussed phentermine, it's MOA and how it can help with appetite. We also discussed potential side effects. Pt given written information. 2) HTN > His BP is at goal on his current regimen and he is on an ARB for renal protection. Will check a Urine MA today. 3) HLD > His lipid panel in July 2017 was ago goal. Pt is tolerating his Simvastatin well. Will check a Lipid panel and CMP today. RTC 3 months    Pt voices understanding and agreement with the plan. Follow-up Disposition:  Return in about 3 months (around 9/27/2018).     Copy sent to:  Dr. Joan Suh

## 2018-06-27 NOTE — MR AVS SNAPSHOT
[address] [facility] [address] [id number] [phone] Patient: Dragan Torres MRN: [id number] :1955 Visit Information Date & Time Provider Department Dept. Phone Encounter #  
 2018  4:10 PM Eriberto Lozada, [address] [place] [address] Diabetes and Endocrinology [phone] Follow-up Instructions Return in about 3 months (around 2018). Upcoming Health Maintenance Date Due Hepatitis C Screening 1955 Pneumococcal 19-64 Medium Risk (1 of 1 - PPSV23) 10/29/1972 DTaP/Tdap/Td series (1 - Tdap) 10/29/1974 FOBT Q 1 YEAR AGE 50-75 10/29/2003 ZOSTER VACCINE AGE 60> 2013 MICROALBUMIN Q1 2018 LIPID PANEL Q1 2018 Influenza Age 5 to Adult 2018 EYE EXAM RETINAL OR DILATED Q1 2018 HEMOGLOBIN A1C Q6M 2018 FOOT EXAM Q1 3/1/2019 Allergies as of 2018  Review Complete On: 2018 By: Eriberto Lozada MD  
  
 Severity Noted Reaction Type Reactions Jardiance [Empagliflozin]  2017    Other (comments) Polyuria Penicillins  2015    Hives Current Immunizations  Never Reviewed No immunizations on file. Not reviewed this visit You Were Diagnosed With   
  
 Codes Comments Type 2 diabetes mellitus with diabetic nephropathy, without long-term current use of insulin (HCC)    -  Primary ICD-10-CM: E11.21 
ICD-9-CM: 250.40, 583.81 Essential hypertension     ICD-10-CM: I10 
ICD-9-CM: 401.9 Mixed hyperlipidemia     ICD-10-CM: E78.2 ICD-9-CM: 272.2 Vitals BP Pulse Height(growth percentile) Weight(growth percentile) BMI Smoking Status 115/67 98 5' 6\" (1.676 m) 215 lb (97.5 kg) 34.7 kg/m2 Never Smoker Vitals History BMI and BSA Data Body Mass Index Body Surface Area 34.7 kg/m 2 2.13 m 2 [phone] Your Updated Medication List  
  
   
This list is accurate as of 6/27/18  4:23 PM.  Always use your most recent med list.  
  
  
  
  
 aspirin delayed-release 81 mg tablet Take  by mouth daily. Takes 2 daily CINNAMON 500 mg Cap Generic drug:  cinnamon bark Take  by mouth two (2) times a day. glipiZIDE 5 mg tablet Commonly known as:  Rsahad Guido Take 1 Tab by mouth two (2) times a day. One with breakfast and one with dinner  
  
 lansoprazole 30 mg capsule Commonly known as:  PREVACID  
as needed. metFORMIN  mg tablet Commonly known as:  GLUCOPHAGE XR  
TAKE 2 TABLETS TWICE A DAY FOR DIABETES  
  
 mometasone 0.1 % topical cream  
Commonly known as:  ELOCON  
  
 multivitamin tablet Commonly known as:  ONE A DAY Take 1 Tab by mouth daily. olmesartan 20 mg tablet Commonly known as:  [employer] Take 1 Tab by mouth daily. (replaces brand Benicar) OMEGA 3-6-9 PO Take  by mouth two (2) times a day. Julio Lay IQ METER Misc Generic drug:  Blood-Glucose Meter USE TO CHECK BLOOD SUGAR THREE TIMES A DAY  
  
 ONETOUCH VERIO strip Generic drug:  glucose blood VI test strips CHECK BLOOD SUGAR THREE TIMES A DAY pioglitazone 30 mg tablet Commonly known as:  ACTOS  
TAKE 1 TABLET DAILY FOR DIABETES  
  
 simvastatin 20 mg tablet Commonly known as:  ZOCOR  
TAKE 1 TABLET NIGHTLY FOR CHOLESTEROL  
  
 TRULICITY 1.5 NR/4.6 mL sub-q pen Generic drug:  dulaglutide INJECT 0.5 ML (1 PEN) UNDER THE SKIN EVERY 7 DAYS  
  
 VIAGRA 100 mg tablet Generic drug:  sildenafil citrate Follow-up Instructions Return in about 3 months (around 9/27/2018). Patient Instructions Phentermine (By mouth) Phentermine (FEN-ter-meen) Helps you lose weight when used for a short time. Brand Name(s): Rolando Gutierres There may be other brand names for this medicine. When This Medicine Should Not Be Used: This medicine is not right for everyone. Do not use it if you had an allergic reaction to phentermine or similar medicines, or if you are pregnant. How to Use This Medicine:  
Dissolving Tablet, Capsule, Long Acting Capsule, Tablet, Dissolving Tablet · Your doctor will tell you how much medicine to use. Do not use more than directed. · This medicine is not for long-term use. · To avoid trouble sleeping, always take this medicine in the morning and never at bedtime or late in the evening. ¨ Take the capsule 2 hours after breakfast. 
¨ Take the extended-release capsule before breakfast. 
¨ Take the disintegrating tablet in the morning, with or without food. ¨ Take the phentermine tablet before breakfast or 1 to 2 hours after breakfast. 
¨ Take Lomaira tablet 30 minutes before meals. · Swallow the extended-release capsule whole. Do not crush, break, or chew it. · If you are using the disintegrating tablet, make sure your hands are dry before you handle the tablet. Place the tablet on your tongue. It should melt quickly. After the tablet has melted, swallow or take a drink of water. · Tablet: Swallow whole. Do not crush, break, or chew it. · Carefully follow your doctor's instructions about any special diet. · Missed dose: Take a dose as soon as you remember. If it is almost time for your next dose, wait until then and take a regular dose. Do not take extra medicine to make up for a missed dose. · Store the medicine in a closed container at room temperature, away from heat, moisture, and direct light. Drugs and Foods to Avoid: Ask your doctor or pharmacist before using any other medicine, including over-the-counter medicines, vitamins, and herbal products. · Do not use this medicine and an MAO inhibitor (MAOI) within 14 days of each other. · Some medicines can affect how phentermine works. Tell your doctor if you are using any of the following: ¨ Amphetamine medicine (including dextroamphetamine, methamphetamine) ¨ Diet pills ¨ Insulin or diabetes medicine ¨ Medicine to treat depression (including fluoxetine, fluvoxamine, paroxetine, sertraline) · Do not drink alcohol while you are using this medicine. Warnings While Using This Medicine: · It is not safe to take this medicine during pregnancy. It could harm an unborn baby. Tell your doctor right away if you become pregnant. · Tell your doctor if you are breastfeeding, or if you have kidney disease, diabetes, glaucoma, congestive heart failure, heart or blood vessel disease, high blood pressure, overactive thyroid, or a history of stroke or drug abuse. Tell your doctor if have allergies to aspirin or tartrazine. · This medicine may cause the following problems: 
¨ Primary pulmonary hypertension (a serious lung problem) ¨ Heart valve disease ¨ Changes in blood sugar levels · This medicine may make you dizzy or drowsy. Do not drive or do anything else that could be dangerous until you know how this medicine affects you. · This medicine can be habit-forming. Do not use more than your prescribed dose. Call your doctor if you think your medicine is not working. · Do not stop using this medicine suddenly. Your doctor will need to slowly decrease your dose before you stop it completely. · Your doctor will check your progress and the effects of this medicine at regular visits. Keep all appointments. · Keep all medicine out of the reach of children. Never share your medicine with anyone. Possible Side Effects While Using This Medicine:  
Call your doctor right away if you notice any of these side effects: · Allergic reaction: Itching or hives, swelling in your face or hands, swelling or tingling in your mouth or throat, chest tightness, trouble breathing · Chest pain, fainting, trouble breathing · Fast, slow, pounding, or uneven heartbeat · Seizures or tremors · Severe headache · Swelling of your feet or lower legs If you notice these less serious side effects, talk with your doctor: · Changes in sex drive · Dizziness, drowsiness, mild headache · Dry mouth or a bad taste in your mouth · Nausea, vomiting, diarrhea, constipation, stomach cramps · Restlessness or nervousness, trouble sleeping If you notice other side effects that you think are caused by this medicine, tell your doctor. Call your doctor for medical advice about side effects. You may report side effects to FDA at 2-363-PNX-9663 © 2017 [facility] Information is for End User's use only and may not be sold, redistributed or otherwise used for commercial purposes. The above information is an  only. It is not intended as medical advice for individual conditions or treatments. Talk to your doctor, nurse or pharmacist before following any medical regimen to see if it is safe and effective for you. Introducing [facility]! Dear Sekou Wasserman: Thank you for requesting a [employer] account. Our records indicate that you already have an active [employer] account. You can access your account anytime at [email or web address]. [email or web address] Did you know that you can access your hospital and ER discharge instructions at any time in [employer]? You can also review all of your test results from your hospital stay or ER visit. Additional Information If you have questions, please visit the Frequently Asked Questions section of the [employer] website at [email or web address] Remember, [employer] is NOT to be used for urgent needs. For medical emergencies, dial 911. Now available from your iPhone and Android! Please provide this summary of care documentation to your next provider. Your primary care clinician is listed as Danny Grigsby. If you have any questions after today's visit, please call [phone].

## 2018-06-28 LAB
ALBUMIN SERPL-MCNC: 4.2 G/DL (ref 3.6–4.8)
ALBUMIN/CREAT UR: 128.3 MG/G CREAT (ref 0–30)
ALBUMIN/GLOB SERPL: 1.6 {RATIO} (ref 1.2–2.2)
ALP SERPL-CCNC: 109 IU/L (ref 39–117)
ALT SERPL-CCNC: 30 IU/L (ref 0–44)
AST SERPL-CCNC: 22 IU/L (ref 0–40)
BILIRUB SERPL-MCNC: 0.3 MG/DL (ref 0–1.2)
BUN SERPL-MCNC: 23 MG/DL (ref 8–27)
BUN/CREAT SERPL: 18 (ref 10–24)
CALCIUM SERPL-MCNC: 9.1 MG/DL (ref 8.6–10.2)
CHLORIDE SERPL-SCNC: 102 MMOL/L (ref 96–106)
CHOLEST SERPL-MCNC: 140 MG/DL (ref 100–199)
CO2 SERPL-SCNC: 20 MMOL/L (ref 20–29)
CREAT SERPL-MCNC: 1.26 MG/DL (ref 0.76–1.27)
CREAT UR-MCNC: 74.6 MG/DL
EST. AVERAGE GLUCOSE BLD GHB EST-MCNC: 177 MG/DL
GLOBULIN SER CALC-MCNC: 2.6 G/DL (ref 1.5–4.5)
GLUCOSE SERPL-MCNC: 212 MG/DL (ref 65–99)
HBA1C MFR BLD: 7.8 % (ref 4.8–5.6)
HDLC SERPL-MCNC: 51 MG/DL
INTERPRETATION, 910389: NORMAL
LDLC SERPL CALC-MCNC: 47 MG/DL (ref 0–99)
Lab: NORMAL
MICROALBUMIN UR-MCNC: 95.7 UG/ML
POTASSIUM SERPL-SCNC: 4.1 MMOL/L (ref 3.5–5.2)
PROT SERPL-MCNC: 6.8 G/DL (ref 6–8.5)
SODIUM SERPL-SCNC: 138 MMOL/L (ref 134–144)
TRIGL SERPL-MCNC: 210 MG/DL (ref 0–149)
VLDLC SERPL CALC-MCNC: 42 MG/DL (ref 5–40)

## 2018-08-16 RX ORDER — EMPAGLIFLOZIN 10 MG/1
TABLET, FILM COATED ORAL
Qty: 90 TAB | Refills: 1 | Status: SHIPPED | OUTPATIENT
Start: 2018-08-16 | End: 2018-08-16

## 2018-08-16 RX ORDER — GLIPIZIDE 5 MG/1
TABLET ORAL
Qty: 180 TAB | Refills: 1 | Status: SHIPPED | OUTPATIENT
Start: 2018-08-16 | End: 2019-03-14 | Stop reason: SDUPTHER

## 2018-10-04 ENCOUNTER — OFFICE VISIT (OUTPATIENT)
Dept: ENDOCRINOLOGY | Age: 63
End: 2018-10-04

## 2018-10-04 VITALS
WEIGHT: 215 LBS | BODY MASS INDEX: 34.55 KG/M2 | HEART RATE: 92 BPM | SYSTOLIC BLOOD PRESSURE: 108 MMHG | DIASTOLIC BLOOD PRESSURE: 72 MMHG | HEIGHT: 66 IN

## 2018-10-04 DIAGNOSIS — E78.2 MIXED HYPERLIPIDEMIA: ICD-10-CM

## 2018-10-04 DIAGNOSIS — E11.21 TYPE 2 DIABETES MELLITUS WITH DIABETIC NEPHROPATHY, WITHOUT LONG-TERM CURRENT USE OF INSULIN (HCC): Primary | ICD-10-CM

## 2018-10-04 DIAGNOSIS — I10 ESSENTIAL HYPERTENSION: ICD-10-CM

## 2018-10-04 RX ORDER — OLOPATADINE HYDROCHLORIDE 1 MG/ML
SOLUTION/ DROPS OPHTHALMIC
Refills: 5 | COMMUNITY
Start: 2018-09-22 | End: 2020-12-16

## 2018-10-04 RX ORDER — PHENTERMINE HYDROCHLORIDE 37.5 MG/1
TABLET ORAL
Qty: 100 TAB | Refills: 1 | Status: SHIPPED | OUTPATIENT
Start: 2018-10-04 | End: 2019-05-20 | Stop reason: SDUPTHER

## 2018-10-04 NOTE — PATIENT INSTRUCTIONS
1) Phentermine: Take 1/2 tablet about 30 minutes before dinner. Phentermine (By mouth)   Phentermine (FEN-ter-meen)  Helps you lose weight when used for a short time. Brand Name(s): Adipex-P, Lomaira   There may be other brand names for this medicine. When This Medicine Should Not Be Used: This medicine is not right for everyone. Do not use it if you had an allergic reaction to phentermine or similar medicines, or if you are pregnant. How to Use This Medicine:   Dissolving Tablet, Capsule, Long Acting Capsule, Tablet, Dissolving Tablet  · Your doctor will tell you how much medicine to use. Do not use more than directed. · This medicine is not for long-term use. · To avoid trouble sleeping, always take this medicine in the morning and never at bedtime or late in the evening. ¨ Take the capsule 2 hours after breakfast.  ¨ Take the extended-release capsule before breakfast.  ¨ Take the disintegrating tablet in the morning, with or without food. ¨ Take the phentermine tablet before breakfast or 1 to 2 hours after breakfast.  ¨ Take Lomaira tablet 30 minutes before meals. · Swallow the extended-release capsule whole. Do not crush, break, or chew it. · If you are using the disintegrating tablet, make sure your hands are dry before you handle the tablet. Place the tablet on your tongue. It should melt quickly. After the tablet has melted, swallow or take a drink of water. · Tablet: Swallow whole. Do not crush, break, or chew it. · Carefully follow your doctor's instructions about any special diet. · Missed dose: Take a dose as soon as you remember. If it is almost time for your next dose, wait until then and take a regular dose. Do not take extra medicine to make up for a missed dose. · Store the medicine in a closed container at room temperature, away from heat, moisture, and direct light.   Drugs and Foods to Avoid:   Ask your doctor or pharmacist before using any other medicine, including over-the-counter medicines, vitamins, and herbal products. · Do not use this medicine and an MAO inhibitor (MAOI) within 14 days of each other. · Some medicines can affect how phentermine works. Tell your doctor if you are using any of the following:  ¨ Amphetamine medicine (including dextroamphetamine, methamphetamine)  ¨ Diet pills  ¨ Insulin or diabetes medicine  ¨ Medicine to treat depression (including fluoxetine, fluvoxamine, paroxetine, sertraline)  · Do not drink alcohol while you are using this medicine. Warnings While Using This Medicine:   · It is not safe to take this medicine during pregnancy. It could harm an unborn baby. Tell your doctor right away if you become pregnant. · Tell your doctor if you are breastfeeding, or if you have kidney disease, diabetes, glaucoma, congestive heart failure, heart or blood vessel disease, high blood pressure, overactive thyroid, or a history of stroke or drug abuse. Tell your doctor if have allergies to aspirin or tartrazine. · This medicine may cause the following problems:  ¨ Primary pulmonary hypertension (a serious lung problem)  ¨ Heart valve disease  ¨ Changes in blood sugar levels  · This medicine may make you dizzy or drowsy. Do not drive or do anything else that could be dangerous until you know how this medicine affects you. · This medicine can be habit-forming. Do not use more than your prescribed dose. Call your doctor if you think your medicine is not working. · Do not stop using this medicine suddenly. Your doctor will need to slowly decrease your dose before you stop it completely. · Your doctor will check your progress and the effects of this medicine at regular visits. Keep all appointments. · Keep all medicine out of the reach of children. Never share your medicine with anyone.   Possible Side Effects While Using This Medicine:   Call your doctor right away if you notice any of these side effects:  · Allergic reaction: Itching or hives, swelling in your face or hands, swelling or tingling in your mouth or throat, chest tightness, trouble breathing  · Chest pain, fainting, trouble breathing  · Fast, slow, pounding, or uneven heartbeat  · Seizures or tremors  · Severe headache  · Swelling of your feet or lower legs  If you notice these less serious side effects, talk with your doctor:   · Changes in sex drive  · Dizziness, drowsiness, mild headache  · Dry mouth or a bad taste in your mouth  · Nausea, vomiting, diarrhea, constipation, stomach cramps  · Restlessness or nervousness, trouble sleeping  If you notice other side effects that you think are caused by this medicine, tell your doctor. Call your doctor for medical advice about side effects. You may report side effects to FDA at 0-835-FDA-6175  © 2017 [address] Information is for End User's use only and may not be sold, redistributed or otherwise used for commercial purposes. The above information is an  only. It is not intended as medical advice for individual conditions or treatments. Talk to your doctor, nurse or pharmacist before following any medical regimen to see if it is safe and effective for you.

## 2018-10-04 NOTE — MR AVS SNAPSHOT
[address] [place] [address] [address] [id number] [phone] Patient: Prem Kelly MRN: [id number] :1955 Visit Information Date & Time Provider Department Dept. Phone Encounter #  
 10/4/2018  4:10 PM Angela Perez, [address] [place] [address] Diabetes and Endocrinology [phone] [id number] Follow-up Instructions Return in about 3 months (around 2019). Upcoming Health Maintenance Date Due Hepatitis C Screening 1955 Pneumococcal 19-64 Medium Risk (1 of 1 - PPSV23) 10/29/1972 DTaP/Tdap/Td series (1 - Tdap) 10/29/1974 Shingrix Vaccine Age 50> (1 of 2) 10/29/2003 FOBT Q 1 YEAR AGE 50-75 10/29/2003 Influenza Age 5 to Adult 2018 HEMOGLOBIN A1C Q6M 2018 FOOT EXAM Q1 2019 MICROALBUMIN Q1 2019 LIPID PANEL Q1 2019 EYE EXAM RETINAL OR DILATED Q1 2019 Allergies as of 10/4/2018  Review Complete On: 10/4/2018 By: Chance Rodriguez LPN Severity Noted Reaction Type Reactions Jardiance [Empagliflozin]  2017    Other (comments) Polyuria Penicillins  2015    Hives Current Immunizations  Never Reviewed No immunizations on file. Not reviewed this visit You Were Diagnosed With   
  
 Codes Comments Type 2 diabetes mellitus with diabetic nephropathy, without long-term current use of insulin (HCC)    -  Primary ICD-10-CM: E11.21 
ICD-9-CM: 250.40, 583.81 Essential hypertension     ICD-10-CM: I10 
ICD-9-CM: 401.9 Mixed hyperlipidemia     ICD-10-CM: E78.2 ICD-9-CM: 272.2 Vitals BP Pulse Height(growth percentile) Weight(growth percentile) BMI Smoking Status 108/72 92 5' 6\" (1.676 m) 215 lb (97.5 kg) 34.7 kg/m2 Never Smoker Vitals History BMI and BSA Data Body Mass Index Body Surface Area 34.7 kg/m 2 2.13 m 2 Preferred Pharmacy Pharmacy Name Phone [address] [address], [facility] [phone] Your Updated Medication List  
  
   
This list is accurate as of 10/4/18  4:44 PM.  Always use your most recent med list.  
  
  
  
  
 aspirin delayed-release 81 mg tablet Take  by mouth daily. Takes 2 daily CINNAMON 500 mg Cap Generic drug:  cinnamon bark Take  by mouth two (2) times a day. glipiZIDE 5 mg tablet Commonly known as:  GLUCOTROL  
TAKE 1 TABLET TWICE A DAY WITH BREAKFAST AND DINNER  
  
 lansoprazole 30 mg capsule Commonly known as:  PREVACID  
as needed. metFORMIN  mg tablet Commonly known as:  GLUCOPHAGE XR  
TAKE 2 TABLETS TWICE A DAY FOR DIABETES  
  
 mometasone 0.1 % topical cream  
Commonly known as:  ELOCON  
as needed. multivitamin tablet Commonly known as:  ONE A DAY Take 1 Tab by mouth daily. olmesartan 20 mg tablet Commonly known as:  [employer] Take 1 Tab by mouth daily. (replaces brand Benicar) olopatadine 0.1 % ophthalmic solution Commonly known as:  PATANOL INSTILL 1 DROP IN BOTH EYES BID OMEGA 3-6-9 PO Take  by mouth two (2) times a day. Kary Endo IQ METER Misc Generic drug:  Blood-Glucose Meter USE TO CHECK BLOOD SUGAR THREE TIMES A DAY  
  
 ONETOUCH VERIO strip Generic drug:  glucose blood VI test strips CHECK BLOOD SUGAR THREE TIMES A DAY phentermine 37.5 mg tablet Commonly known as:  ADIPEX-P Take 1/2 30 minutes before dinner  
  
 pioglitazone 30 mg tablet Commonly known as:  ACTOS  
TAKE 1 TABLET DAILY FOR DIABETES  
  
 simvastatin 20 mg tablet Commonly known as:  ZOCOR  
TAKE 1 TABLET NIGHTLY FOR CHOLESTEROL  
  
 TRULICITY 1.5 TF/0.2 mL sub-q pen Generic drug:  dulaglutide INJECT 0.5 ML (1 PEN) UNDER THE SKIN EVERY 7 DAYS  
  
 VIAGRA 100 mg tablet Generic drug:  sildenafil citrate Prescriptions Printed Refills phentermine (ADIPEX-P) 37.5 mg tablet 1 Sig: Take 1/2 30 minutes before dinner Class: Print We Performed the Following HEMOGLOBIN A1C WITH EAG [16741 CPT(R)]  DIABETES FOOT EXAM [7 Custom] Follow-up Instructions Return in about 3 months (around 1/4/2019). To-Do List   
 01/04/2019 Lab:  CBC W/O DIFF   
  
 01/04/2019 Lab:  HEMOGLOBIN A1C WITH EAG   
  
 01/04/2019 Lab:  LIPID PANEL   
  
 01/04/2019 Lab:  METABOLIC PANEL, COMPREHENSIVE Patient Instructions 1) Phentermine: Take 1/2 tablet about 30 minutes before dinner. Phentermine (By mouth) Phentermine (FEN-ter-meen) Helps you lose weight when used for a short time. Brand Name(s): Natacha Perez There may be other brand names for this medicine. When This Medicine Should Not Be Used: This medicine is not right for everyone. Do not use it if you had an allergic reaction to phentermine or similar medicines, or if you are pregnant. How to Use This Medicine:  
Dissolving Tablet, Capsule, Long Acting Capsule, Tablet, Dissolving Tablet · Your doctor will tell you how much medicine to use. Do not use more than directed. · This medicine is not for long-term use. · To avoid trouble sleeping, always take this medicine in the morning and never at bedtime or late in the evening. ¨ Take the capsule 2 hours after breakfast. 
¨ Take the extended-release capsule before breakfast. 
¨ Take the disintegrating tablet in the morning, with or without food. ¨ Take the phentermine tablet before breakfast or 1 to 2 hours after breakfast. 
¨ Take Lomaira tablet 30 minutes before meals. · Swallow the extended-release capsule whole. Do not crush, break, or chew it. · If you are using the disintegrating tablet, make sure your hands are dry before you handle the tablet. Place the tablet on your tongue. It should melt quickly. After the tablet has melted, swallow or take a drink of water. · Tablet: Swallow whole. Do not crush, break, or chew it. · Carefully follow your doctor's instructions about any special diet. · Missed dose: Take a dose as soon as you remember. If it is almost time for your next dose, wait until then and take a regular dose. Do not take extra medicine to make up for a missed dose. · Store the medicine in a closed container at room temperature, away from heat, moisture, and direct light. Drugs and Foods to Avoid: Ask your doctor or pharmacist before using any other medicine, including over-the-counter medicines, vitamins, and herbal products. · Do not use this medicine and an MAO inhibitor (MAOI) within 14 days of each other. · Some medicines can affect how phentermine works. Tell your doctor if you are using any of the following: ¨ Amphetamine medicine (including dextroamphetamine, methamphetamine) ¨ Diet pills ¨ Insulin or diabetes medicine ¨ Medicine to treat depression (including fluoxetine, fluvoxamine, paroxetine, sertraline) · Do not drink alcohol while you are using this medicine. Warnings While Using This Medicine: · It is not safe to take this medicine during pregnancy. It could harm an unborn baby. Tell your doctor right away if you become pregnant. · Tell your doctor if you are breastfeeding, or if you have kidney disease, diabetes, glaucoma, congestive heart failure, heart or blood vessel disease, high blood pressure, overactive thyroid, or a history of stroke or drug abuse. Tell your doctor if have allergies to aspirin or tartrazine. · This medicine may cause the following problems: 
¨ Primary pulmonary hypertension (a serious lung problem) ¨ Heart valve disease ¨ Changes in blood sugar levels · This medicine may make you dizzy or drowsy. Do not drive or do anything else that could be dangerous until you know how this medicine affects you. · This medicine can be habit-forming.  Do not use more than your prescribed dose. Call your doctor if you think your medicine is not working. · Do not stop using this medicine suddenly. Your doctor will need to slowly decrease your dose before you stop it completely. · Your doctor will check your progress and the effects of this medicine at regular visits. Keep all appointments. · Keep all medicine out of the reach of children. Never share your medicine with anyone. Possible Side Effects While Using This Medicine:  
Call your doctor right away if you notice any of these side effects: · Allergic reaction: Itching or hives, swelling in your face or hands, swelling or tingling in your mouth or throat, chest tightness, trouble breathing · Chest pain, fainting, trouble breathing · Fast, slow, pounding, or uneven heartbeat · Seizures or tremors · Severe headache · Swelling of your feet or lower legs If you notice these less serious side effects, talk with your doctor: · Changes in sex drive · Dizziness, drowsiness, mild headache · Dry mouth or a bad taste in your mouth · Nausea, vomiting, diarrhea, constipation, stomach cramps · Restlessness or nervousness, trouble sleeping If you notice other side effects that you think are caused by this medicine, tell your doctor. Call your doctor for medical advice about side effects. You may report side effects to FDA at 2-281-FDA-4061 © 2017 [facility] Information is for End User's use only and may not be sold, redistributed or otherwise used for commercial purposes. The above information is an  only. It is not intended as medical advice for individual conditions or treatments. Talk to your doctor, nurse or pharmacist before following any medical regimen to see if it is safe and effective for you. Introducing [facility]! Dear Jc Momin: Thank you for requesting a [employer] account. Our records indicate that you already have an active [employer] account.   You can access your account anytime at [email or web address] Did you know that you can access your hospital and ER discharge instructions at any time in [employer]? You can also review all of your test results from your hospital stay or ER visit. Additional Information If you have questions, please visit the Frequently Asked Questions section of the [employer] website at [email or web address] Remember, [employer] is NOT to be used for urgent needs. For medical emergencies, dial 911. Now available from your iPhone and Android! Please provide this summary of care documentation to your next provider. Your primary care clinician is listed as Danny Grigsby. If you have any questions after today's visit, please call [phone].

## 2018-10-04 NOTE — PROGRESS NOTES
Chief Complaint   Patient presents with    Diabetes     pcp and pharamcy verified. Eye exam UTD   Records since last visit reviewed. History of Present Illness: Jose Carlos Lay is a 57 y.o. male here for follow up of diabetes. He was diagnosed with diabetes around 2005. At his last visit in June 2018 his A1C had improved to 7.8% on Metformin XR 1000mg BID, Actos 30mg HS Glipizide 5mg with dinner and Trulicity 8.7BW weekly. His wife noted that he was ravenously hungry in the afternoon and asked if there was something to help with that. We discussed phentermine. Pt's wife notes he has been eating fried foods and he has been eating ice cream at night. Pt is currently taking Metformin XR 1000mg BID, Actos 30mg HS, Glipizide 5mg BID and Trulicity 7.0LE weekly. He is not checking his BGs. Pt is eating 3 meals daily  He has breakfast around 630AM, today he had a boiled egg and canales a Armenian and coffee (sugar). He will occasionally have a snack in the mid-morning. He has lunch around Noon-1PM, today he had a clemencia bread with deli meat, no side items and water. He will have an afternoon snack of a sandwich and a piece of fruit. He has dinner around 6-7PM, last night he had salmon patties, potatoes, broccoli and spoon bread and water. His largest meal of the day is dinner. His wife is eating ice cream and cookies in the evening. Exercise consists of very little. Pt is self employed as a . He and his wife walk in the evening when the weather is good. He does not go regularly. No history of vascular disease. No history of retinopathy or neuropathy, but does have nephropathy (history of positive microalbuminuria and CKD). Last eye exam was August 2018, no retinopathy, (records reviewed). Pt denies any issues of numbness or burning in his feet. Pt saw Dr. Wendy Webster of GI, he had EGD and colonoscopy and gastric emptying stomach.  He was found to have slow emptying of his stomach (gastroparesis) and a hiatal hernia. He was instructed to sleep with a wedge pillow and was started on Prevacid. Since that time he has been having less GERD. His lipid panel was at goal in June 2018 and he has been tolerating his regimen of Simvastatin 20mg daily with no issues. Current Outpatient Prescriptions   Medication Sig    phentermine (ADIPEX-P) 37.5 mg tablet Take 1/2 30 minutes before dinner    glipiZIDE (GLUCOTROL) 5 mg tablet TAKE 1 TABLET TWICE A DAY WITH BREAKFAST AND DINNER    aspirin delayed-release 81 mg tablet Take  by mouth daily. Takes 2 daily    simvastatin (ZOCOR) 20 mg tablet TAKE 1 TABLET NIGHTLY FOR CHOLESTEROL    metFORMIN ER (GLUCOPHAGE XR) 500 mg tablet TAKE 2 TABLETS TWICE A DAY FOR DIABETES    pioglitazone (ACTOS) 30 mg tablet TAKE 1 TABLET DAILY FOR DIABETES    TRULICITY 1.5 ND/2.1 mL sub-q pen INJECT 0.5 ML (1 PEN) UNDER THE SKIN EVERY 7 DAYS    lansoprazole (PREVACID) 30 mg capsule as needed.  ONETOUCH VERIO strip CHECK BLOOD SUGAR THREE TIMES A DAY    olmesartan (BENICAR) 20 mg tablet Take 1 Tab by mouth daily. (replaces brand Benicar) (Patient taking differently: Take 10 mg by mouth daily. (replaces brand Benicar))    ONETOUCH VERIO IQ METER misc USE TO CHECK BLOOD SUGAR THREE TIMES A DAY    mometasone (ELOCON) 0.1 % topical cream as needed.  VIAGRA 100 mg tablet     multivitamin (ONE A DAY) tablet Take 1 Tab by mouth daily.  OM-3/E/LINOL/ALA/OLEIC/GLA/LIP (OMEGA 3-6-9 PO) Take  by mouth two (2) times a day.  cinnamon bark (CINNAMON) 500 mg cap Take  by mouth two (2) times a day.  olopatadine (PATANOL) 0.1 % ophthalmic solution INSTILL 1 DROP IN BOTH EYES BID     No current facility-administered medications for this visit.       Allergies   Allergen Reactions    Jardiance [Empagliflozin] Other (comments)     Polyuria    Penicillins Hives     Review of Systems:  - Eyes: no blurry vision or double vision  - Cardiovascular: no chest pain  - Respiratory: no shortness of breath  - Musculoskeletal: no myalgias  - Neurological: no numbness/tingling in extremities    Physical Examination:  Blood pressure 108/72, pulse 92, height 5' 6\" (1.676 m), weight 215 lb (97.5 kg). - General: pleasant, no distress, good eye contact   - Neck: no carotid bruits  - Cardiovascular: regular, normal rate, nl s1 and s2, no m/r/g, 2+ DP pulses   - Respiratory: clear bilaterally  - Integumentary: no edema, no foot ulcers, sensation to monofilament and vibration intact bilaterally  - Psychiatric: normal mood and affect    Diabetic foot exam:     Left Foot:   Visual Exam: normal    Pulse DP: 2+ (normal)   Filament test: normal sensation    Vibratory sensation: normal      Right Foot:   Visual Exam: normal    Pulse DP: 2+ (normal)   Filament test: normal sensation    Vibratory sensation: normal    Data Reviewed:   -none    Assessment/Plan:   1) DM > Pt has not been checking his BGs. Will check an A1C today. Will start pt on phentermine 18.75mg 30 minutes before dinner, as he and his wife note his cravings tend to be in the evening. We discussed phentermine, it's MOA and how it can help with appetite. We also discussed potential side effects. Pt given written information. Pt to check his BGs twice per day. 2) HTN > His BP is at goal on his current regimen and he is on an ARB for renal protection. 3) HLD > His lipid panel in June 2018 was ago goal. Pt is tolerating his Simvastatin well. RTC 3 months    Pt voices understanding and agreement with the plan. Follow-up Disposition:  Return in about 3 months (around 1/4/2019).     Copy sent to:  Dr. Criss Dorsey

## 2018-10-05 LAB
EST. AVERAGE GLUCOSE BLD GHB EST-MCNC: 169 MG/DL
HBA1C MFR BLD: 7.5 % (ref 4.8–5.6)

## 2018-10-29 RX ORDER — DULAGLUTIDE 1.5 MG/.5ML
INJECTION, SOLUTION SUBCUTANEOUS
Qty: 6 ML | Refills: 3 | Status: SHIPPED | OUTPATIENT
Start: 2018-10-29 | End: 2019-10-18 | Stop reason: SDUPTHER

## 2018-12-14 RX ORDER — PIOGLITAZONEHYDROCHLORIDE 30 MG/1
TABLET ORAL
Qty: 90 TAB | Refills: 2 | Status: SHIPPED | OUTPATIENT
Start: 2018-12-14 | End: 2019-09-16 | Stop reason: SDUPTHER

## 2018-12-14 RX ORDER — METFORMIN HYDROCHLORIDE 500 MG/1
TABLET, EXTENDED RELEASE ORAL
Qty: 360 TAB | Refills: 2 | Status: SHIPPED | OUTPATIENT
Start: 2018-12-14 | End: 2019-10-01 | Stop reason: SDUPTHER

## 2018-12-17 RX ORDER — LECITHIN 1200 MG
1 CAPSULE ORAL DAILY
COMMUNITY

## 2018-12-17 RX ORDER — LANOLIN ALCOHOL/MO/W.PET/CERES
1000 CREAM (GRAM) TOPICAL DAILY
COMMUNITY

## 2018-12-17 NOTE — PERIOP NOTES
[facility]  Ambulatory Surgery Unit  Pre-operative Instructions    Surgery/Procedure Date  Thursday, December 20, 2018            Tentative Arrival Time 0945      1. On the day of your surgery/procedure, please report to the Ambulatory Surgery Unit Registration Desk and sign in at your designated time. The Ambulatory Surgery Unit is located in [facility] on the [facility] side of the [facility] across from the [address] [place] [address]. Please have all of your health insurance cards and a photo ID. 2. You must have someone with you to drive you home, as you should not drive a car for 24 hours following anesthesia. Please make arrangements for a responsible adult friend or family member to stay with you for at least the first 24 hours after your surgery. 3. Do not have anything to eat or drink (including water, gum, mints, coffee, juice) after 11:59 PM, Wednesday. This may not apply to medications prescribed by your physician. (Please note below the special instructions with medications to take the morning of surgery, if applicable.)    4. We recommend you do not drink any alcoholic beverages for 24 hours before and after your surgery. 5. Contact your surgeons office for instructions on the following medications: non-steroidal anti-inflammatory drugs (i.e. Advil, Aleve), vitamins, and supplements. (Some surgeons will want you to stop these medications prior to surgery and others may allow you to take them)   **If you are currently taking Plavix, Coumadin, Aspirin and/or other blood-thinning agents, contact your surgeon for instructions. ** Your surgeon will partner with the physician prescribing these medications to determine if it is safe to stop or if you need to continue taking. Please do not stop taking these medications without instructions from your surgeon.     6. In an effort to help prevent surgical site infection, we ask that you shower with an anti-bacterial soap (i.e. Dial/Safeguard, or the soap provided to you at your preadmission testing appointment) for 3 days prior to and on the morning of surgery, using a fresh towel after each shower. (Please begin this process with fresh bed linens.) Do not apply any lotions, powders, or deodorants after the shower on the day of your procedure. If applicable, please do not shave the operative site for 48 hours prior to surgery. 7. Wear comfortable clothes. Wear glasses instead of contacts. Do not bring any jewelry or money (other than copays or fees as instructed). Do not wear make-up, particularly mascara, the morning of your surgery. Do not wear nail polish, particularly if you are having foot /hand surgery. Wear your hair loose or down, no ponytails, buns, tyler pins or clips. All body piercings must be removed. 8. You should understand that if you do not follow these instructions your surgery may be cancelled. If your physical condition changes (i.e. fever, cold or flu) please contact your surgeon as soon as possible. 9. It is important that you be on time. If a situation occurs where you may be late, or if you have any questions or problems, please call [phone].    10. Your surgery time may be subject to change. You will receive a phone call the day prior to surgery to confirm your arrival time. 11. Pediatric patients: please bring a change of clothes, diapers, bottle/sippy cup, pacifier, etc.      Special Instructions: Take all medications and inhalers, as prescribed, on the morning of surgery with a sip of water EXCEPT: no diabetic medications day of surgery      Insulin Dependent Diabetic patients: Take your diabetic medications as prescribed the day before surgery. Hold all diabetic medications the day of surgery. If you are scheduled to arrive for surgery after 8:00 AM, and your AM blood sugar is >200, please call Ambulatory Surgery.       I understand a pre-operative phone call will be made to verify my surgery time. In the event that I am not available, I give permission for a message to be left on my answering service and/or with another person?       yes    Preop instructions reviewed  Pt verbalized understanding.      ___________________      ___________________      ________________  (Signature of Patient)          (Witness)                   (Date and Time)

## 2018-12-19 ENCOUNTER — ANESTHESIA EVENT (OUTPATIENT)
Dept: SURGERY | Age: 63
End: 2018-12-19
Payer: MEDICARE

## 2018-12-20 ENCOUNTER — HOSPITAL ENCOUNTER (OUTPATIENT)
Age: 63
Setting detail: OUTPATIENT SURGERY
Discharge: HOME | End: 2018-12-20
Attending: SPECIALIST | Admitting: SPECIALIST
Payer: MEDICARE

## 2018-12-20 ENCOUNTER — ANESTHESIA (OUTPATIENT)
Dept: SURGERY | Age: 63
End: 2018-12-20
Payer: MEDICARE

## 2018-12-20 VITALS
BODY MASS INDEX: 33.12 KG/M2 | WEIGHT: 211 LBS | DIASTOLIC BLOOD PRESSURE: 88 MMHG | TEMPERATURE: 97.7 F | OXYGEN SATURATION: 96 % | SYSTOLIC BLOOD PRESSURE: 135 MMHG | HEART RATE: 82 BPM | HEIGHT: 67 IN | RESPIRATION RATE: 20 BRPM

## 2018-12-20 LAB
GLUCOSE BLD STRIP.AUTO-MCNC: 130 MG/DL (ref 65–100)
GLUCOSE BLD STRIP.AUTO-MCNC: 159 MG/DL (ref 65–100)
SERVICE CMNT-IMP: ABNORMAL
SERVICE CMNT-IMP: ABNORMAL

## 2018-12-20 PROCEDURE — 77030020255 HC SOL INJ LR 1000ML BG: Performed by: SPECIALIST

## 2018-12-20 PROCEDURE — 88342 IMHCHEM/IMCYTCHM 1ST ANTB: CPT

## 2018-12-20 PROCEDURE — 74011250636 HC RX REV CODE- 250/636

## 2018-12-20 PROCEDURE — 74011250636 HC RX REV CODE- 250/636: Performed by: ANESTHESIOLOGY

## 2018-12-20 PROCEDURE — 76210000040 HC AMBSU PH I REC FIRST 0.5 HR: Performed by: SPECIALIST

## 2018-12-20 PROCEDURE — 77030021352 HC CBL LD SYS DISP COVD -B: Performed by: SPECIALIST

## 2018-12-20 PROCEDURE — 76210000046 HC AMBSU PH II REC FIRST 0.5 HR: Performed by: SPECIALIST

## 2018-12-20 PROCEDURE — 76060000073 HC AMB SURG ANES FIRST 0.5 HR: Performed by: SPECIALIST

## 2018-12-20 PROCEDURE — 77030019988 HC FCPS ENDOSC DISP BSC -B: Performed by: SPECIALIST

## 2018-12-20 PROCEDURE — 88305 TISSUE EXAM BY PATHOLOGIST: CPT

## 2018-12-20 PROCEDURE — 76030000002 HC AMB SURG OR TIME FIRST 0.: Performed by: SPECIALIST

## 2018-12-20 PROCEDURE — 82962 GLUCOSE BLOOD TEST: CPT

## 2018-12-20 RX ORDER — SODIUM CHLORIDE, SODIUM LACTATE, POTASSIUM CHLORIDE, CALCIUM CHLORIDE 600; 310; 30; 20 MG/100ML; MG/100ML; MG/100ML; MG/100ML
25 INJECTION, SOLUTION INTRAVENOUS CONTINUOUS
Status: DISCONTINUED | OUTPATIENT
Start: 2018-12-20 | End: 2018-12-20 | Stop reason: HOSPADM

## 2018-12-20 RX ORDER — ONDANSETRON 2 MG/ML
4 INJECTION INTRAMUSCULAR; INTRAVENOUS AS NEEDED
Status: DISCONTINUED | OUTPATIENT
Start: 2018-12-20 | End: 2018-12-20 | Stop reason: HOSPADM

## 2018-12-20 RX ORDER — SODIUM CHLORIDE 0.9 % (FLUSH) 0.9 %
5-10 SYRINGE (ML) INJECTION EVERY 8 HOURS
Status: DISCONTINUED | OUTPATIENT
Start: 2018-12-20 | End: 2018-12-20 | Stop reason: HOSPADM

## 2018-12-20 RX ORDER — SODIUM CHLORIDE 0.9 % (FLUSH) 0.9 %
5-10 SYRINGE (ML) INJECTION AS NEEDED
Status: DISCONTINUED | OUTPATIENT
Start: 2018-12-20 | End: 2018-12-20 | Stop reason: HOSPADM

## 2018-12-20 RX ORDER — DIPHENHYDRAMINE HYDROCHLORIDE 50 MG/ML
12.5 INJECTION, SOLUTION INTRAMUSCULAR; INTRAVENOUS
Status: DISCONTINUED | OUTPATIENT
Start: 2018-12-20 | End: 2018-12-20 | Stop reason: HOSPADM

## 2018-12-20 RX ORDER — FENTANYL CITRATE 50 UG/ML
25 INJECTION, SOLUTION INTRAMUSCULAR; INTRAVENOUS
Status: DISCONTINUED | OUTPATIENT
Start: 2018-12-20 | End: 2018-12-20 | Stop reason: HOSPADM

## 2018-12-20 RX ORDER — PROPOFOL 10 MG/ML
INJECTION, EMULSION INTRAVENOUS AS NEEDED
Status: DISCONTINUED | OUTPATIENT
Start: 2018-12-20 | End: 2018-12-20 | Stop reason: HOSPADM

## 2018-12-20 RX ORDER — LIDOCAINE HYDROCHLORIDE 20 MG/ML
INJECTION, SOLUTION EPIDURAL; INFILTRATION; INTRACAUDAL; PERINEURAL AS NEEDED
Status: DISCONTINUED | OUTPATIENT
Start: 2018-12-20 | End: 2018-12-20 | Stop reason: HOSPADM

## 2018-12-20 RX ORDER — MORPHINE SULFATE 10 MG/ML
2 INJECTION, SOLUTION INTRAMUSCULAR; INTRAVENOUS
Status: DISCONTINUED | OUTPATIENT
Start: 2018-12-20 | End: 2018-12-20 | Stop reason: HOSPADM

## 2018-12-20 RX ORDER — LIDOCAINE HYDROCHLORIDE 10 MG/ML
0.1 INJECTION, SOLUTION EPIDURAL; INFILTRATION; INTRACAUDAL; PERINEURAL AS NEEDED
Status: DISCONTINUED | OUTPATIENT
Start: 2018-12-20 | End: 2018-12-20 | Stop reason: HOSPADM

## 2018-12-20 RX ORDER — SODIUM CHLORIDE 9 MG/ML
50 INJECTION, SOLUTION INTRAVENOUS CONTINUOUS
Status: DISCONTINUED | OUTPATIENT
Start: 2018-12-20 | End: 2018-12-20 | Stop reason: HOSPADM

## 2018-12-20 RX ORDER — DEXTROMETHORPHAN/PSEUDOEPHED 2.5-7.5/.8
1.2 DROPS ORAL
Status: DISCONTINUED | OUTPATIENT
Start: 2018-12-20 | End: 2018-12-20 | Stop reason: HOSPADM

## 2018-12-20 RX ADMIN — PROPOFOL 150 MG: 10 INJECTION, EMULSION INTRAVENOUS at 12:07

## 2018-12-20 RX ADMIN — LIDOCAINE HYDROCHLORIDE 100 MG: 20 INJECTION, SOLUTION EPIDURAL; INFILTRATION; INTRACAUDAL; PERINEURAL at 12:07

## 2018-12-20 RX ADMIN — SODIUM CHLORIDE, POTASSIUM CHLORIDE, SODIUM LACTATE AND CALCIUM CHLORIDE: 600; 310; 30; 20 INJECTION, SOLUTION INTRAVENOUS at 12:02

## 2018-12-20 RX ADMIN — PROPOFOL 40 MG: 10 INJECTION, EMULSION INTRAVENOUS at 12:09

## 2018-12-20 NOTE — PERIOP NOTES
1219-Pt received to pacu with supernova mask on. Pt remains under the effects of anesthesia. VSS.   1230-Pt waking to voice, O2 off. Blood sugar re-checked =130. Pt states it is okay to review discharge instructions and pertinent health information with wife  Wife at bedside. 1235-Pt awake, head of bed elevated and pt sipping water. Discharge instructions reviewed. They voice no questions or concerns. 1250-pt awake and alert, VSS. Patient meets discharge criteria and agrees he is ready to go home, wife  also agrees. PIV removed. Wife assisted pt with dressing. 1301-Pt discharged home in stable condition via w/c to car.

## 2018-12-20 NOTE — PROCEDURES
Esophagogastroduodenoscopy Procedure Note      Salvador Lugo  1955  [id number]    Indication:  H/O Atrophic Gastritis     Endoscopist: Jorge Gibbs MD    Referring Provider:  Esha Arrieta MD    Sedation:  MAC anesthesia Propofol    Procedure Details:  After infomed consent was obtained for the procedure, with all risks and benefits of procedure explained the patient was taken to the endoscopy suite and placed in the left lateral decubitus position. Following sequential administration of sedation as per above, the endoscope was inserted into the mouth and advanced under direct vision to second portion of the duodenum. A careful inspection was made as the gastroscope was withdrawn, including a retroflexed view of the proximal stomach; findings and interventions are described below. Findings:     Esophagus:   + There was normal mucosa throughout the esophagus.    - Normal Z line located at 40 cm from the incisors. Stomach:   + There was diffuse salt and pepper erythema throughout the stomach in body and fundus without erosion c/w moderate gastritis s/p multiple bx. No nodules or lesions noted. Duodenum:   - The bulb and post bulbar mucosa is normal in appearance to the second portion. The duodenal folds appeared normal.     Therapies:  See above    Specimen: Specimens were collected as described and send to the laboratory. Complications:   None were encountered during the procedure. EBL:  < 10 ml.           Recommendations:   -f/u path to determine if surveillance EGDs needed    Jorge Gibbs MD  12/20/2018  12:15 PM

## 2018-12-20 NOTE — DISCHARGE INSTRUCTIONS
To Valdez  [id number]  1955    EGD DISCHARGE INSTRUCTIONS  Discomfort:  Sore throat- throat lozenges or warm salt water gargle  redness at IV site- apply warm compress to area; if redness or soreness persist- contact your physician  Gaseous discomfort- walking, belching will help relieve any discomfort  You may not operate a vehicle for 24 hours  You may not engage in an occupation involving machinery or appliances for rest of today. You may not drink alcoholic beverages for at least 24 hours  Avoid making any critical decisions for at least 24 hour  DIET  You may resume your regular diet - however -  remember your colon is empty and a heavy meal will produce gas. Avoid these foods:  vegetables, fried / greasy foods, carbonated drinks  MEDICATIONS        Regarding Aspirin or Nonsteroidal medications specifically, please see below. ACTIVITY  You may resume your normal daily activities. Spend the remainder of the day resting -  avoid any strenuous activity. CALL M.D. ANY SIGN OF   Increasing pain, nausea, vomiting  Abdominal distension (swelling)  New increased bleeding (oral or rectal)  Fever (chills)  Pain in chest area  Bloody discharge from nose or mouth  Shortness of breath    ONLY  Tylenol as needed for pain. Follow-up Instructions:   Call Dr. Arianna Townsend for results of procedure / biopsy in 4-5 days at telephone #  [phone]              Continue acid reducing medications and diet changes for Gastroparesis. DO NOT TAKE SLEEPING MEDICATIONS OR ANTIANXIETY MEDICATIONS WHILE TAKING NARCOTIC PAIN MEDICATIONS,  ESPECIALLY THE NIGHT OF ANESTHESIA. CPAP PATIENTS BE SURE TO WEAR MACHINE WHENEVER NAPPING OR SLEEPING. DISCHARGE SUMMARY from Nurse    The following personal items collected during your admission are returned to you:   Dental Appliance: Dental Appliances:  At home(bridge bottom right)  Vision: Visual Aid: Glasses(readers)  Hearing Aid:    Jewelry:    Clothing:    Other Valuables:    Valuables sent to safe:        PATIENT INSTRUCTIONS:    After General Anesthesia or Intravenous Sedation, for 24 hours or while taking prescription Narcotics:        Someone should be with you for the next 24 hours. For your own safety, a responsible adult must drive you home. · Limit your activities  · Recommended activity: Rest today, up with assistance today. Do not climb stairs or shower unattended for the next 24 hours. · Please start with a soft bland diet and advance as tolerated (no nausea) to regular diet. · If you have a sore throat you should try the following: fluids, warm salt water gargles, or throat lozenges. If it does not improve after several days please follow up with your primary physician. · Do not drive and operate hazardous machinery  · Do not make important personal or business decisions  · Do  not drink alcoholic beverages  · If you have not urinated within 8 hours after discharge, please contact your surgeon on call. Report the following to your surgeon:  · Excessive pain, swelling, redness or odor of or around the surgical area  · Temperature over 100.5  · Nausea and vomiting lasting longer than 4 hours or if unable to take medications  · Any signs of decreased circulation or nerve impairment to extremity: change in color, persistent  numbness, tingling, coldness or increase pain      · You will receive a Post Operative Call from one of the Recovery Room Nurses on the day after your surgery to check on you. It is very important for us to know how you are recovering after your surgery. If you have an issue or need to speak with someone, please call your surgeon, do not wait for the post operative call. · You may receive an e-mail or letter in the mail from [facility] regarding your experience with us in the Ambulatory Surgery Unit. Your feedback is valuable to us and we appreciate your participation in the survey.        · If the above instructions are not adequate, please contact Marcello Diaz RN, [facility] anesthesia Nurse Manager or our Anesthesiologist, at [phone]. If you are having problems after your surgery, call the physician at his office number. · We wish you a speedy recovery ? What to do at Home:      *  Please give a list of your current medications to your Primary Care Provider. *  Please update this list whenever your medications are discontinued, doses are      changed, or new medications (including over-the-counter products) are added. *  Please carry medication information at all times in case of emergency situations. These are general instructions for a healthy lifestyle:    No smoking/ No tobacco products/ Avoid exposure to second hand smoke    Surgeon General's Warning:  Quitting smoking now greatly reduces serious risk to your health. Obesity, smoking, and sedentary lifestyle greatly increases your risk for illness    A healthy diet, regular physical exercise & weight monitoring are important for maintaining a healthy lifestyle    You may be retaining fluid if you have a history of heart failure or if you experience any of the following symptoms:  Weight gain of 3 pounds or more overnight or 5 pounds in a week, increased swelling in our hands or feet or shortness of breath while lying flat in bed. Please call your doctor as soon as you notice any of these symptoms; do not wait until your next office visit. Recognize signs and symptoms of STROKE:    B - Balance  E - Eyes    F-  Face looks uneven    A-  Arms unable to move or move even    S-  Speech slurred or non-existent    T-  Time-call 911 as soon as signs and symptoms begin-DO NOT go       Back to bed or wait to see if you get better-TIME IS BRAIN. If you have not received your influenza and/or pneumococcal vaccine, please follow up with your primary care physician. The discharge information has been reviewed with the patient and spouse.   The patient and spouse verbalized understanding.

## 2018-12-20 NOTE — ANESTHESIA POSTPROCEDURE EVALUATION
Procedure(s):  ESOPHAGOGASTRODUODENOSCOPY (EGD)  ESOPHAGOGASTRODUODENAL (EGD) BIOPSY. Anesthesia Post Evaluation        Patient location during evaluation: PACU  Note status: Adequate. Level of consciousness: responsive to verbal stimuli and sleepy but conscious  Pain management: satisfactory to patient  Airway patency: patent  Anesthetic complications: no  Cardiovascular status: acceptable  Respiratory status: acceptable  Hydration status: acceptable  Comments: +Post-Anesthesia Evaluation and Assessment    Patient: Nehemias Adams MRN: [id number]  SSN: [id number]   YOB: 1955  Age: 70 y.o. Sex: male      Cardiovascular Function/Vital Signs    /88   Pulse 82   Temp 36.5 °C (97.7 °F)   Resp 20   Ht 5' 7\" (1.702 m)   Wt 95.7 kg (211 lb)   SpO2 96%   BMI 33.05 kg/m²     Patient is status post Procedure(s):  ESOPHAGOGASTRODUODENOSCOPY (EGD)  ESOPHAGOGASTRODUODENAL (EGD) BIOPSY. Nausea/Vomiting: Controlled. Postoperative hydration reviewed and adequate. Pain:  Pain Scale 1: Numeric (0 - 10) (12/20/18 1234)  Pain Intensity 1: 0 (12/20/18 1234)   Managed. Neurological Status:   Neuro (WDL): Within Defined Limits (12/20/18 1234)   At baseline. Mental Status and Level of Consciousness: Arousable. Pulmonary Status:   O2 Device: Room air (12/20/18 1234)   Adequate oxygenation and airway patent. Complications related to anesthesia: None    Post-anesthesia assessment completed. No concerns.     Signed By: Zuly Mckinnon MD    12/20/2018  Post anesthesia nausea and vomiting:  controlled      Visit Vitals  /88   Pulse 82   Temp 36.5 °C (97.7 °F)   Resp 20   Ht 5' 7\" (1.702 m)   Wt 95.7 kg (211 lb)   SpO2 96%   BMI 33.05 kg/m²

## 2018-12-20 NOTE — H&P
Gastroenterology Outpatient History and Physical    Patient: Xander Arzate    Physician: Nilda Baldwin MD    Vital Signs: Blood pressure (!) 129/91, pulse 87, temperature 97.6 °F (36.4 °C), resp. rate 19, height 5' 7\" (1.702 m), weight 95.7 kg (211 lb), SpO2 95 %. Allergies: Allergies   Allergen Reactions    Jardiance [Empagliflozin] Other (comments)     Polyuria    Penicillins Hives       Chief Complaint: Gastritis with atrophy    History of Present Illness: 69 yo BM with dyspepsia due to gastroparesis and h/o atrophic gastritis for EGD. Justification for Procedure: above    History:  Past Medical History:   Diagnosis Date    Diabetes ([address]     High cholesterol     Hypertension       Past Surgical History:   Procedure Laterality Date    HX COLONOSCOPY      HX ENDOSCOPY        Social History     Socioeconomic History    Marital status:      Spouse name: Not on file    Number of children: Not on file    Years of education: Not on file    Highest education level: Not on file   Tobacco Use    Smoking status: Never Smoker    Smokeless tobacco: Never Used   Substance and Sexual Activity    Alcohol use: Yes     Comment: Rare    Drug use: No      Family History   Problem Relation Age of Onset    Diabetes Mother     Thyroid Disease Mother     Stroke Father     Diabetes Sister     No Known Problems Sister     Diabetes Maternal Grandmother     No Known Problems Brother     No Known Problems Brother     No Known Problems Brother     No Known Problems Brother     No Known Problems Brother        Medications:   Prior to Admission medications    Medication Sig Start Date End Date Taking? Authorizing Provider   apple cider vinegar 600 mg cap Take 1 Tab by mouth daily. Yes Provider, Historical   cyanocobalamin (VITAMIN B-12) 1,000 mcg tablet Take 1,000 mcg by mouth daily.    Yes Provider, Historical   pioglitazone (ACTOS) 30 mg tablet TAKE 1 TABLET DAILY FOR DIABETES 12/14/18  Yes Flaca Osborn MD   metFORMIN ER (GLUCOPHAGE XR) 500 mg tablet TAKE 2 TABLETS TWICE A DAY FOR DIABETES 12/14/18  Yes Flaca Osborn MD   TRULICITY 1.5 MV/3.8 mL sub-q pen INJECT 0.5 ML (1 PEN) UNDER THE SKIN EVERY 7 DAYS 10/29/18  Yes Flaca Osborn MD   phentermine (ADIPEX-P) 37.5 mg tablet Take 1/2 30 minutes before dinner 10/4/18  Yes Flaca Osborn MD   glipiZIDE (GLUCOTROL) 5 mg tablet TAKE 1 TABLET TWICE A DAY WITH BREAKFAST AND DINNER 8/16/18  Yes Flaca Osborn MD   aspirin delayed-release 81 mg tablet Take  by mouth daily. Takes 2 daily   Yes Provider, Historical   simvastatin (ZOCOR) 20 mg tablet TAKE 1 TABLET NIGHTLY FOR CHOLESTEROL  Patient taking differently: TAKE 1/2 TABLET NIGHTLY FOR CHOLESTEROL 4/30/18  Yes Flaca Osborn MD   lansoprazole (PREVACID) 30 mg capsule as needed. 11/15/17  Yes Provider, Historical   ONETOUCH VERIO strip CHECK BLOOD SUGAR THREE TIMES A DAY 8/21/17  Yes Flaca Osborn MD   olmesartan (BENICAR) 20 mg tablet Take 1 Tab by mouth daily. (replaces brand Benicar)  Patient taking differently: Take 10 mg by mouth daily. (replaces brand Benicar) 7/13/17  Yes Flaca Osborn MD   [facility] VERIO IQ METER misc USE TO CHECK BLOOD SUGAR THREE TIMES A DAY 7/10/17  Yes Flaca Osborn MD   mometasone (ELOCON) 0.1 % topical cream as needed. 12/16/16  Yes Provider, Historical   multivitamin (ONE A DAY) tablet Take 1 Tab by mouth daily. Yes Provider, Historical   OM-3/E/LINOL/ALA/OLEIC/GLA/LIP (OMEGA 3-6-9 PO) Take  by mouth two (2) times a day. Yes Provider, Historical   cinnamon bark (CINNAMON) 500 mg cap Take  by mouth two (2) times a day. Yes Provider, Historical   olopatadine (PATANOL) 0.1 % ophthalmic solution INSTILL 1 DROP IN BOTH EYES BID 9/22/18   Provider, Historical   VIAGRA 100 mg tablet  5/22/16   Provider, Historical       Physical Exam:   General: alert, no distress   HEENT: Head: Normocephalic, no lesions, without obvious abnormality. Heart: regular rate and rhythm, S1, S2 normal, no murmur, click, rub or gallop   Lungs: chest clear, no wheezing, rales, normal symmetric air entry   Abdominal: Bowel sounds are normal, liver is not enlarged, spleen is not enlarged   Neurological: Grossly normal   Extremities: extremities normal, atraumatic, no cyanosis or edema     Findings/Diagnosis: Atrophic gastritis, dyspepsia    Plan of Care/Planned Procedure: EGD    Signed By: Zach Gómez MD     December 20, 2018

## 2018-12-20 NOTE — PERIOP NOTES
Tootie Bijan  1955  [id number]    Situation:  Verbal report given from:  TRISTIAN Avila  Procedure: Procedure(s):  ESOPHAGOGASTRODUODENOSCOPY (EGD)  ESOPHAGOGASTRODUODENAL (EGD) BIOPSY    Background:    Preoperative diagnosis: GASTRITIS, GERD, GASTROPARESIS DUE TO DIABETES MELLITUS, HIATAL HERNIA, DIAPHRAGMATIC HERNIA WITHOUT OBSTRUCTION OR GANGRENE, BREATH SMELLS UNPLEASANT, ATROPHIC GASTRITIS    Postoperative diagnosis: GASTRITIS    :  Dr. Davon Moreno    Assistant(s): Circ-2: Margareth Glover RN  Scrub Tech-1: Jose Rafael CISNEROS    Specimens:   ID Type Source Tests Collected by Time Destination   1 : GASTRIC BIOPSY  Preservative Gastric  Radha Green MD 12/20/2018 1210 Pathology       Assessment:  Intra-procedure medications         Anesthesia gave intra-procedure sedation and medications, see anesthesia flow sheet     Intravenous fluids: LR@ KVO     Vital signs stable       Recommendation:    Permission to share finding with wife : yes

## 2018-12-20 NOTE — ANESTHESIA PREPROCEDURE EVALUATION
Anesthetic History   No history of anesthetic complications            Review of Systems / Medical History  Patient summary reviewed, nursing notes reviewed and pertinent labs reviewed    Pulmonary  Within defined limits                 Neuro/Psych   Within defined limits           Cardiovascular    Hypertension          Hyperlipidemia    Exercise tolerance: >4 METS     GI/Hepatic/Renal     GERD      Hiatal hernia    Comments: GASTROPARESIS   DIAPHRAGMATIC HERNIA WITHOUT OBSTRUCTION  Endo/Other    Diabetes    Obesity     Other Findings              Physical Exam    Airway  Mallampati: III    Neck ROM: normal range of motion   Mouth opening: Normal     Cardiovascular  Regular rate and rhythm,  S1 and S2 normal,  no murmur, click, rub, or gallop             Dental      Comments: No loose teeth   Pulmonary  Breath sounds clear to auscultation               Abdominal  GI exam deferred       Other Findings            Anesthetic Plan    ASA: 2  Anesthesia type: total IV anesthesia          Induction: Intravenous  Anesthetic plan and risks discussed with: Patient

## 2019-01-04 DIAGNOSIS — E78.2 MIXED HYPERLIPIDEMIA: ICD-10-CM

## 2019-01-04 DIAGNOSIS — E11.21 TYPE 2 DIABETES MELLITUS WITH DIABETIC NEPHROPATHY, WITHOUT LONG-TERM CURRENT USE OF INSULIN (HCC): ICD-10-CM

## 2019-01-04 DIAGNOSIS — I10 ESSENTIAL HYPERTENSION: ICD-10-CM

## 2019-01-14 RX ORDER — OLMESARTAN MEDOXOMIL 20 MG/1
TABLET ORAL
Qty: 90 TAB | Refills: 3 | Status: SHIPPED | OUTPATIENT
Start: 2019-01-14 | End: 2020-05-14 | Stop reason: SDUPTHER

## 2019-01-14 RX ORDER — BLOOD SUGAR DIAGNOSTIC
STRIP MISCELLANEOUS
Qty: 300 STRIP | Refills: 3 | Status: SHIPPED | OUTPATIENT
Start: 2019-01-14 | End: 2020-09-05 | Stop reason: SDUPTHER

## 2019-01-15 ENCOUNTER — TELEPHONE (OUTPATIENT)
Dept: ENDOCRINOLOGY | Age: 64
End: 2019-01-15

## 2019-01-15 NOTE — TELEPHONE ENCOUNTER
Lorna, [address] [facility]             The pt returned the call he missed from [place], and is requesting another back.        Best contact number is [phone]

## 2019-01-16 NOTE — TELEPHONE ENCOUNTER
Advised patient that [facility] faxed a request for Simvastatin. His last lipid profile was last year. Advised there are orders in the computer for him to have labs drawn. He has an upcoming appt on 1/31/19. Advised to have labs a week prior to office visit. Patient states that he will try. He states that he has plenty of Simvastatin for now.  Advised will hold the Rx request until his results are back in case he needs a dose adjustment

## 2019-01-27 LAB
ALBUMIN SERPL-MCNC: 4.5 G/DL (ref 3.6–4.8)
ALBUMIN/GLOB SERPL: 1.8 {RATIO} (ref 1.2–2.2)
ALP SERPL-CCNC: 106 IU/L (ref 39–117)
ALT SERPL-CCNC: 29 IU/L (ref 0–44)
AST SERPL-CCNC: 20 IU/L (ref 0–40)
BILIRUB SERPL-MCNC: 0.3 MG/DL (ref 0–1.2)
BUN SERPL-MCNC: 21 MG/DL (ref 8–27)
BUN/CREAT SERPL: 14 (ref 10–24)
CALCIUM SERPL-MCNC: 9.5 MG/DL (ref 8.6–10.2)
CHLORIDE SERPL-SCNC: 102 MMOL/L (ref 96–106)
CHOLEST SERPL-MCNC: 151 MG/DL (ref 100–199)
CO2 SERPL-SCNC: 24 MMOL/L (ref 20–29)
CREAT SERPL-MCNC: 1.49 MG/DL (ref 0.76–1.27)
ERYTHROCYTE [DISTWIDTH] IN BLOOD BY AUTOMATED COUNT: 14.6 % (ref 12.3–15.4)
EST. AVERAGE GLUCOSE BLD GHB EST-MCNC: 194 MG/DL
GLOBULIN SER CALC-MCNC: 2.5 G/DL (ref 1.5–4.5)
GLUCOSE SERPL-MCNC: 158 MG/DL (ref 65–99)
HBA1C MFR BLD: 8.4 % (ref 4.8–5.6)
HCT VFR BLD AUTO: 46.3 % (ref 37.5–51)
HDLC SERPL-MCNC: 55 MG/DL
HGB BLD-MCNC: 15.5 G/DL (ref 13–17.7)
INTERPRETATION, 910389: NORMAL
INTERPRETATION: NORMAL
LDLC SERPL CALC-MCNC: 77 MG/DL (ref 0–99)
Lab: NORMAL
MCH RBC QN AUTO: 29.8 PG (ref 26.6–33)
MCHC RBC AUTO-ENTMCNC: 33.5 G/DL (ref 31.5–35.7)
MCV RBC AUTO: 89 FL (ref 79–97)
PDF IMAGE, 910387: NORMAL
PLATELET # BLD AUTO: 196 X10E3/UL (ref 150–379)
POTASSIUM SERPL-SCNC: 4.9 MMOL/L (ref 3.5–5.2)
PROT SERPL-MCNC: 7 G/DL (ref 6–8.5)
RBC # BLD AUTO: 5.21 X10E6/UL (ref 4.14–5.8)
SODIUM SERPL-SCNC: 141 MMOL/L (ref 134–144)
TRIGL SERPL-MCNC: 97 MG/DL (ref 0–149)
VLDLC SERPL CALC-MCNC: 19 MG/DL (ref 5–40)
WBC # BLD AUTO: 4.9 X10E3/UL (ref 3.4–10.8)

## 2019-01-30 DIAGNOSIS — E78.2 MIXED HYPERLIPIDEMIA: Primary | ICD-10-CM

## 2019-01-30 RX ORDER — SIMVASTATIN 20 MG/1
TABLET, FILM COATED ORAL
Qty: 90 TAB | Refills: 3 | Status: SHIPPED | OUTPATIENT
Start: 2019-01-30 | End: 2020-02-04

## 2019-01-31 ENCOUNTER — OFFICE VISIT (OUTPATIENT)
Dept: ENDOCRINOLOGY | Age: 64
End: 2019-01-31

## 2019-01-31 VITALS
HEIGHT: 67 IN | WEIGHT: 209.6 LBS | BODY MASS INDEX: 32.9 KG/M2 | DIASTOLIC BLOOD PRESSURE: 85 MMHG | SYSTOLIC BLOOD PRESSURE: 123 MMHG | HEART RATE: 85 BPM

## 2019-01-31 DIAGNOSIS — I10 ESSENTIAL HYPERTENSION: ICD-10-CM

## 2019-01-31 DIAGNOSIS — E11.21 TYPE 2 DIABETES MELLITUS WITH DIABETIC NEPHROPATHY, WITHOUT LONG-TERM CURRENT USE OF INSULIN (HCC): Primary | ICD-10-CM

## 2019-01-31 DIAGNOSIS — E78.2 MIXED HYPERLIPIDEMIA: ICD-10-CM

## 2019-01-31 NOTE — PROGRESS NOTES
Chief Complaint   Patient presents with    Diabetes     pcp and pharmacy verified   Records since last visit reviewed. History of Present Illness: Kai Fitzpatrick is a 70 y.o. male here for follow up of diabetes. He was diagnosed with diabetes around 2005. At our last visit in October 2018 his A1C was 7.5%. He noted he had been ravenously hungry in the evening so I gave him a prescription for Phentermine 18.75mg with dinner to help with the appetite in the evening. His A1C last week was up to 8.4%. His is currently taking the Pt is currently taking Metformin XR 1000mg BID, Actos 30mg HS, Glipizide 5mg BID and Trulicity 2.3SP weekly. He is not checking his BGs. He has been taking his phentermine after dinner. It does not seem to be helping with his appetite at night. He denies any recent illnesses, injuries or hospitalizations. Pt is eating 3 meals daily  He has breakfast around 630AM, today he had a boiled egg and canales, 6 biscuits, chicken and gravy and a banana. He will occasionally have a snack in the mid-morning. He has lunch around Noon-1PM, yesterday he had BP&J, a banana and water. He will have an afternoon snack of a sandwich and a piece of fruit. He has dinner around 6-7PM, last night he had vegetable soup, chicken, two slices of bread and water. He had cake and pineapple for desert. His largest meal of the day is dinner. Pt's reports that at night he is eating a whole can of biscuits \"If I don't I will be hungry at bedtime\". His wife notes that he will still have ice cream and cake in the evening. He will also have a can of pork and beans in the evening. No history of vascular disease. No history of retinopathy or neuropathy, but does have nephropathy (history of positive microalbuminuria and CKD). Last eye exam was August 2018, no retinopathy, (records reviewed). Pt denies any issues of numbness or burning in his feet.   Pt saw Dr. Alejandro Bustos of , he had EGD and colonoscopy and gastric emptying stomach. He was found to have slow emptying of his stomach (gastroparesis) and a hiatal hernia. He saw Dr. Akash Marcelino and Britany Dumont said my stomach was looking better\". He was instructed to sleep with a wedge pillow and was started on Prevacid. Since that time he has been having less GERD. His lipid panel was at goal in June 2018 and he has been tolerating his regimen of Simvastatin 20mg daily with no issues. Current Outpatient Medications   Medication Sig    simvastatin (ZOCOR) 20 mg tablet TAKE 1 TABLET NIGHTLY FOR CHOLESTEROL    ONETOUCH VERIO strip USE TO CHECK BLOOD SUGAR THREE TIMES A DAY    olmesartan (BENICAR) 20 mg tablet TAKE 1 TABLET DAILY    apple cider vinegar 600 mg cap Take 1 Tab by mouth daily.  cyanocobalamin (VITAMIN B-12) 1,000 mcg tablet Take 1,000 mcg by mouth daily.  pioglitazone (ACTOS) 30 mg tablet TAKE 1 TABLET DAILY FOR DIABETES    metFORMIN ER (GLUCOPHAGE XR) 500 mg tablet TAKE 2 TABLETS TWICE A DAY FOR DIABETES    TRULICITY 1.5 WS/2.6 mL sub-q pen INJECT 0.5 ML (1 PEN) UNDER THE SKIN EVERY 7 DAYS    phentermine (ADIPEX-P) 37.5 mg tablet Take 1/2 30 minutes before dinner    glipiZIDE (GLUCOTROL) 5 mg tablet TAKE 1 TABLET TWICE A DAY WITH BREAKFAST AND DINNER    aspirin delayed-release 81 mg tablet Take  by mouth daily. Takes 2 daily    lansoprazole (PREVACID) 30 mg capsule daily.  ONETOUCH VERIO IQ METER misc USE TO CHECK BLOOD SUGAR THREE TIMES A DAY    mometasone (ELOCON) 0.1 % topical cream as needed.  VIAGRA 100 mg tablet     multivitamin (ONE A DAY) tablet Take 1 Tab by mouth daily.  OM-3/E/LINOL/ALA/OLEIC/GLA/LIP (OMEGA 3-6-9 PO) Take  by mouth two (2) times a day.  cinnamon bark (CINNAMON) 500 mg cap Take  by mouth two (2) times a day.  olopatadine (PATANOL) 0.1 % ophthalmic solution INSTILL 1 DROP IN BOTH EYES BID     No current facility-administered medications for this visit.       Allergies   Allergen Reactions    Jardiance [Empagliflozin] Other (comments)     Polyuria    Penicillins Hives     Review of Systems:  - Eyes: no blurry vision or double vision  - Cardiovascular: no chest pain  - Respiratory: no shortness of breath  - Musculoskeletal: no myalgias  - Neurological: no numbness/tingling in extremities    Physical Examination:  Blood pressure 123/85, pulse 85, height 5' 7\" (1.702 m), weight 209 lb 9.6 oz (95.1 kg). - General: pleasant, no distress, good eye contact   - Neck: no carotid bruits  - Cardiovascular: regular, normal rate, nl s1 and s2, no m/r/g, 2+ DP pulses   - Respiratory: clear bilaterally  - Integumentary: no edema, no foot ulcers,  - Psychiatric: normal mood and affect    Diabetic foot exam:     Left Foot:   Visual Exam: normal    Pulse DP: 2+ (normal)   Filament test: normal sensation    Vibratory sensation: normal      Right Foot:   Visual Exam: normal    Pulse DP: 2+ (normal)   Filament test: normal sensation    Vibratory sensation: normal    Data Reviewed:   Component      Latest Ref Rng & Units 1/26/2019 1/26/2019 1/26/2019 1/26/2019           7:51 AM  7:51 AM  7:51 AM  7:51 AM   Glucose      65 - 99 mg/dL  158 (H)     BUN      8 - 27 mg/dL  21     Creatinine      0.76 - 1.27 mg/dL  1.49 (H)     GFR est non-AA      >59 mL/min/1.73  49 (L)     GFR est AA      >59 mL/min/1.73  56 (L)     BUN/Creatinine ratio      10 - 24  14     Sodium      134 - 144 mmol/L  141     Potassium      3.5 - 5.2 mmol/L  4.9     Chloride      96 - 106 mmol/L  102     CO2      20 - 29 mmol/L  24     Calcium      8.6 - 10.2 mg/dL  9.5     Protein, total      6.0 - 8.5 g/dL  7.0     Albumin      3.6 - 4.8 g/dL  4.5     GLOBULIN, TOTAL      1.5 - 4.5 g/dL  2.5     A-G Ratio      1.2 - 2.2  1.8     Bilirubin, total      0.0 - 1.2 mg/dL  0.3     Alk.  phosphatase      39 - 117 IU/L  106     AST      0 - 40 IU/L  20     ALT (SGPT)      0 - 44 IU/L  29     WBC      3.4 - 10.8 x10E3/uL 4.9      RBC      4.14 - 5.80 x10E6/uL 5.21      HGB      13.0 - 17.7 g/dL 15.5      HCT      37.5 - 51.0 % 46.3      MCV      79 - 97 fL 89      MCH      26.6 - 33.0 pg 29.8      MCHC      31.5 - 35.7 g/dL 33.5      RDW      12.3 - 15.4 % 14.6      PLATELET      462 - 395 x10E3/uL 196      Cholesterol, total      100 - 199 mg/dL   151    Triglyceride      0 - 149 mg/dL   97    HDL Cholesterol      >39 mg/dL   55    VLDL, calculated      5 - 40 mg/dL   19    LDL, calculated      0 - 99 mg/dL   77    Hemoglobin A1c, (calculated)      4.8 - 5.6 %    8.4 (H)   Estimated average glucose      mg/dL    194       Assessment/Plan:   1) DM > His A1C last week was up to 8.4%. We spoke at length about stopping eating after dinner. I explained to him that the fruit, ice cream, cake and biscuits are all carbohydrates and are increasing his BGs. He does not want to change his medication regimen, but will work on stopping ALL food after dinner and reducing his number of fruits per day to 2. Pt to move his phentermine to after lunch so that he will not be as hungry when he gets home in the afternoon. Pt to check his BGs twice per day. 2) HTN > His BP is at goal on his current regimen and he is on an ARB for renal protection. 3) HLD > His lipid panel in January 2019 was at goal. Pt is tolerating his Simvastatin well. RTC 3 months    Pt voices understanding and agreement with the plan. Follow-up Disposition:  Return in about 3 months (around 4/30/2019).     Copy sent to:  Dr. Fuad Ibrahim

## 2019-03-15 RX ORDER — GLIPIZIDE 5 MG/1
TABLET ORAL
Qty: 180 TAB | Refills: 1 | Status: SHIPPED | OUTPATIENT
Start: 2019-03-15 | End: 2019-09-16 | Stop reason: SDUPTHER

## 2019-05-20 ENCOUNTER — OFFICE VISIT (OUTPATIENT)
Dept: ENDOCRINOLOGY | Age: 64
End: 2019-05-20

## 2019-05-20 VITALS
HEIGHT: 67 IN | DIASTOLIC BLOOD PRESSURE: 80 MMHG | WEIGHT: 204 LBS | SYSTOLIC BLOOD PRESSURE: 122 MMHG | HEART RATE: 95 BPM | BODY MASS INDEX: 32.02 KG/M2

## 2019-05-20 DIAGNOSIS — I10 ESSENTIAL HYPERTENSION: Primary | ICD-10-CM

## 2019-05-20 DIAGNOSIS — E11.21 TYPE 2 DIABETES MELLITUS WITH DIABETIC NEPHROPATHY, WITHOUT LONG-TERM CURRENT USE OF INSULIN (HCC): ICD-10-CM

## 2019-05-20 DIAGNOSIS — E78.2 MIXED HYPERLIPIDEMIA: ICD-10-CM

## 2019-05-20 LAB — HBA1C MFR BLD HPLC: 7.3 %

## 2019-05-20 RX ORDER — TIZANIDINE 2 MG/1
TABLET ORAL
Refills: 0 | COMMUNITY
Start: 2019-05-04 | End: 2021-04-12

## 2019-05-20 RX ORDER — PHENTERMINE HYDROCHLORIDE 37.5 MG/1
TABLET ORAL
Qty: 100 TAB | Refills: 1 | Status: SHIPPED | OUTPATIENT
Start: 2019-05-20 | End: 2019-12-05 | Stop reason: SDUPTHER

## 2019-05-20 NOTE — PROGRESS NOTES
Chief Complaint   Patient presents with    Diabetes     pcp and pharmacy verified. Eye exam   Records since last visit reviewed. History of Present Illness: Maty Dos Santos is a 70 y.o. male here for follow up of diabetes. He was diagnosed with diabetes around 2005. At our last visit in January 2019 his A1C was 8.4% on Actos 30mg HS, Glipizide 5mg BID, Metformin XR 8343IP BID and Trulicity 7.5YS weekly. We discussed AT LENGTH that he needed to stop all eating after dinner. Pt notes he has not been eating his ice cream in the evening. They just got back from a artis to the [facility]. He notes he injured his back, but this has improved and the pain has resolved. His A1C today was 7.3%. His weight today was 204 pounds, which is down 5 pounds since January 2019. His is currently taking the Pt is currently taking Metformin XR 1000mg BID, Actos 30mg HS, Glipizide 5mg BID and Trulicity 8.9UP weekly. He has been taking his phentermine after dinner. He denies any recent illnesses, injuries or hospitalizations. He has been checking his BGs. Pt is eating 3 meals daily  He has breakfast around 8-9AM, today he had a ham and egg sandwich and coffee (sugar). He will occasionally have a snack in the mid-morning. He has lunch around 1PM, today he had a baked chicken sandwich, popcorn and water. He will have an afternoon snack of a fruit (cantelope and pineapple) or mixed nuts  He has dinner around 6-7PM, last night he had baked chicken, broccoli, mashed potatoes and water. His largest meal of the day is dinner. He reports he has stopped snacking or eating after dinner. No history of vascular disease. No history of retinopathy or neuropathy, but does have nephropathy (history of positive microalbuminuria and CKD). Last eye exam was August 2018, no retinopathy, (records reviewed). Pt denies any issues of numbness or burning in his feet.     Pt saw Dr. Ignacio Clemente of , he had EGD and colonoscopy and gastric emptying stomach. He was found to have slow emptying of his stomach (gastroparesis) and a hiatal hernia. He saw Dr. Felisha Amin and Antelmo Whitmore said my stomach was looking better\". He was instructed to sleep with a wedge pillow and was started on Prevacid. Since that time he has been having less GERD. His lipid panel was at goal in January 2019 and he has been tolerating his regimen of Simvastatin 20mg daily with no issues. Current Outpatient Medications   Medication Sig    phentermine (ADIPEX-P) 37.5 mg tablet Take 1/2 30 minutes before dinner    tiZANidine (ZANAFLEX) 2 mg tablet TK 1 T PO TID PRF BACK PAIN    glipiZIDE (GLUCOTROL) 5 mg tablet TAKE 1 TABLET TWICE A DAY WITH BREAKFAST AND DINNER    simvastatin (ZOCOR) 20 mg tablet TAKE 1 TABLET NIGHTLY FOR CHOLESTEROL    ONETOUCH VERIO strip USE TO CHECK BLOOD SUGAR THREE TIMES A DAY    olmesartan (BENICAR) 20 mg tablet TAKE 1 TABLET DAILY (Patient taking differently: TAKE 1/2 TABLET DAILY)    apple cider vinegar 600 mg cap Take 1 Tab by mouth daily.  cyanocobalamin (VITAMIN B-12) 1,000 mcg tablet Take 1,000 mcg by mouth daily.  pioglitazone (ACTOS) 30 mg tablet TAKE 1 TABLET DAILY FOR DIABETES    metFORMIN ER (GLUCOPHAGE XR) 500 mg tablet TAKE 2 TABLETS TWICE A DAY FOR DIABETES    TRULICITY 1.5 FJ/8.7 mL sub-q pen INJECT 0.5 ML (1 PEN) UNDER THE SKIN EVERY 7 DAYS    aspirin delayed-release 81 mg tablet Take  by mouth daily. Takes 2 daily    lansoprazole (PREVACID) 30 mg capsule daily.  ONETOUCH VERIO IQ METER mis USE TO CHECK BLOOD SUGAR THREE TIMES A DAY    mometasone (ELOCON) 0.1 % topical cream as needed.  VIAGRA 100 mg tablet     multivitamin (ONE A DAY) tablet Take 1 Tab by mouth daily.  OM-3/E/LINOL/ALA/OLEIC/GLA/LIP (OMEGA 3-6-9 PO) Take  by mouth two (2) times a day.  cinnamon bark (CINNAMON) 500 mg cap Take  by mouth two (2) times a day.     olopatadine (PATANOL) 0.1 % ophthalmic solution INSTILL 1 DROP IN BOTH EYES BID     No current facility-administered medications for this visit. Allergies   Allergen Reactions    Jardiance [Empagliflozin] Other (comments)     Polyuria    Penicillins Hives     Review of Systems:  - Eyes: no blurry vision or double vision  - Cardiovascular: no chest pain  - Respiratory: no shortness of breath  - Musculoskeletal: no myalgias  - Neurological: no numbness/tingling in extremities    Physical Examination:  Blood pressure 122/80, pulse 95, height 5' 7\" (1.702 m), weight 204 lb (92.5 kg). - General: pleasant, no distress, good eye contact   - Neck: no carotid bruits  - Cardiovascular: regular, normal rate, nl s1 and s2, no m/r/g, 2+ DP pulses   - Respiratory: clear bilaterally  - Integumentary: no edema, no foot ulcers,  - Psychiatric: normal mood and affect    Diabetic foot exam:     Left Foot:   Visual Exam: normal    Pulse DP: 2+ (normal)   Filament test: normal sensation    Vibratory sensation: normal      Right Foot:   Visual Exam: normal    Pulse DP: 2+ (normal)   Filament test: normal sensation    Vibratory sensation: normal    Data Reviewed:   His A1C today was 7.3%. Assessment/Plan:   1) DM > His A1C today was 7.3%. Pt encouraged to keep up the good work and continue the Metformin XR 1000mg BID, Actos 30mg HS, Glipizide 5mg BID and Trulicity 4.7WG weekly. Pt to continue the Phentermine 18.75mg with dinner. Pt to check his BGs twice per day. 2) HTN > His BP is at goal on his current regimen and he is on an ARB for renal protection. 3) HLD > His lipid panel in January 2019 was at goal. Pt is tolerating his Simvastatin well. RTC 3 months    Pt voices understanding and agreement with the plan. Follow-up and Dispositions    · Return in about 3 months (around 8/20/2019).          Copy sent to:  Dr. Olvin Quevedo

## 2019-08-26 ENCOUNTER — OFFICE VISIT (OUTPATIENT)
Dept: ENDOCRINOLOGY | Age: 64
End: 2019-08-26

## 2019-08-26 VITALS
WEIGHT: 201.6 LBS | HEIGHT: 67 IN | SYSTOLIC BLOOD PRESSURE: 121 MMHG | HEART RATE: 93 BPM | DIASTOLIC BLOOD PRESSURE: 79 MMHG | BODY MASS INDEX: 31.64 KG/M2

## 2019-08-26 DIAGNOSIS — E78.2 MIXED HYPERLIPIDEMIA: ICD-10-CM

## 2019-08-26 DIAGNOSIS — E11.21 TYPE 2 DIABETES MELLITUS WITH DIABETIC NEPHROPATHY, WITHOUT LONG-TERM CURRENT USE OF INSULIN (HCC): Primary | ICD-10-CM

## 2019-08-26 DIAGNOSIS — I10 ESSENTIAL HYPERTENSION: ICD-10-CM

## 2019-08-26 LAB — HBA1C MFR BLD HPLC: 7.2 %

## 2019-08-26 NOTE — PATIENT INSTRUCTIONS
Plantar Fasciitis: Exercises  Introduction  Here are some examples of exercises for you to try. The exercises may be suggested for a condition or for rehabilitation. Start each exercise slowly. Ease off the exercises if you start to have pain. You will be told when to start these exercises and which ones will work best for you. How to do the exercises  Towel stretch    1. Sit with your legs extended and knees straight. 2. Place a towel around your foot just under the toes. 3. Hold each end of the towel in each hand, with your hands above your knees. 4. Pull back with the towel so that your foot stretches toward you. 5. Hold the position for at least 15 to 30 seconds. 6. Repeat 2 to 4 times a session, up to 5 sessions a day. Calf stretch    1. Stand facing a wall with your hands on the wall at about eye level. Put the leg you want to stretch about a step behind your other leg. 2. Keeping your back heel on the floor, bend your front knee until you feel a stretch in the back leg. 3. Hold the stretch for 15 to 30 seconds. Repeat 2 to 4 times. Plantar fascia and calf stretch    1. Stand on a step as shown above. Be sure to hold on to the banister. 2. Slowly let your heels down over the edge of the step as you relax your calf muscles. You should feel a gentle stretch across the bottom of your foot and up the back of your leg to your knee. 3. Hold the stretch about 15 to 30 seconds, and then tighten your calf muscle a little to bring your heel back up to the level of the step. Repeat 2 to 4 times. Towel curls    1. While sitting, place your foot on a towel on the floor and scrunch the towel toward you with your toes. 2. Then, also using your toes, push the towel away from you. Camp pickups    1. Put marbles on the floor next to a cup.  2. Using your toes, try to lift the marbles up from the floor and put them in the cup. Follow-up care is a key part of your treatment and safety.  Be sure to make and go to all appointments, and call your doctor if you are having problems. It's also a good idea to know your test results and keep a list of the medicines you take. Where can you learn more? Go to http://vale-cash.info/. Bakari Vasquez in the search box to learn more about \"Plantar Fasciitis: Exercises. \"  Current as of: September 20, 2018  Content Version: 12.1  © 8182-5990 [employer]. Care instructions adapted under license by [employer] (which disclaims liability or warranty for this information). If you have questions about a medical condition or this instruction, always ask your healthcare professional. [address] any warranty or liability for your use of this information.

## 2019-08-26 NOTE — PROGRESS NOTES
Chief Complaint   Patient presents with    Diabetes     pcp and pharmacy verified   Records since last visit reviewed. History of Present Illness: Roberto Blackwell is a 70 y.o. male here for follow up of diabetes. He was diagnosed with diabetes around 2005. At our last visit in May 2019 his A1C had improved from 8.4% down to 7.3%. Pt encouraged to keep up the good work and continue the Metformin XR 1000mg BID, Actos 30mg HS, Glipizide 5mg BID and Trulicity 1.7RT weekly. Pt to continue the Phentermine 18.75mg with dinner. His A1C today was 7.2%. His weight today was 201 pounds with is down 4 pounds since our last visit. Pt notes he had a cortisone injection in his shoulder recently. His is currently taking the Pt is currently taking Metformin XR 1000mg BID, Actos 30mg HS, Glipizide 5mg BID and Trulicity 8.6RB weekly. He has been taking his phentermine after dinner. He denies any recent illnesses, injuries or hospitalizations. He has not been checking his BGs. Pt is eating 3 meals daily  He has breakfast around 7AM, today he had a ham and egg sandwich and coffee (sugar). He will occasionally have a snack in the mid-morning. He has lunch around 1PM, today he had a steak sandwich and water. He will have an afternoon snack of a fruit (cantelope and pineapple) or mixed nuts  He has dinner around 6-7PM, last night he had fried fish. His largest meal of the day is dinner. He reports he has stopped snacking or eating after dinner. \"I want to snack at night and sometimes he will have ice cream in the evening, but not as bad as I used to\". No history of vascular disease. No history of retinopathy or neuropathy, but does have nephropathy (history of positive microalbuminuria and CKD). Last eye exam was 8/20/19, no retinopathy, but \"she noted that my eye pressure was up and she wants to see me back in 2 months. \"      Pt denies any issues of numbness or burning in his feet.     Pt saw Dr. Peng Shay of GI, he had EGD and colonoscopy and gastric emptying stomach. He was found to have slow emptying of his stomach (gastroparesis) and a hiatal hernia. He saw Dr. Peng Shay and Ana Middleton said my stomach was looking better\". His lipid panel was at goal in January 2019 and he has been tolerating his regimen of Simvastatin 20mg daily with no issues. Current Outpatient Medications   Medication Sig    empagliflozin (JARDIANCE) 10 mg tablet Take  by mouth daily.  phentermine (ADIPEX-P) 37.5 mg tablet Take 1/2 30 minutes before dinner    glipiZIDE (GLUCOTROL) 5 mg tablet TAKE 1 TABLET TWICE A DAY WITH BREAKFAST AND DINNER    simvastatin (ZOCOR) 20 mg tablet TAKE 1 TABLET NIGHTLY FOR CHOLESTEROL    ONETOUCH VERIO strip USE TO CHECK BLOOD SUGAR THREE TIMES A DAY    olmesartan (BENICAR) 20 mg tablet TAKE 1 TABLET DAILY (Patient taking differently: TAKE 1/2 TABLET DAILY)    apple cider vinegar 600 mg cap Take 1 Tab by mouth daily.  cyanocobalamin (VITAMIN B-12) 1,000 mcg tablet Take 1,000 mcg by mouth daily.  pioglitazone (ACTOS) 30 mg tablet TAKE 1 TABLET DAILY FOR DIABETES    metFORMIN ER (GLUCOPHAGE XR) 500 mg tablet TAKE 2 TABLETS TWICE A DAY FOR DIABETES    TRULICITY 1.5 VU/6.7 mL sub-q pen INJECT 0.5 ML (1 PEN) UNDER THE SKIN EVERY 7 DAYS    aspirin delayed-release 81 mg tablet Take  by mouth daily. Takes 2 daily    lansoprazole (PREVACID) 30 mg capsule daily.  ONETOUCH VERIO IQ METER misc USE TO CHECK BLOOD SUGAR THREE TIMES A DAY    mometasone (ELOCON) 0.1 % topical cream as needed.  multivitamin (ONE A DAY) tablet Take 1 Tab by mouth daily.  OM-3/E/LINOL/ALA/OLEIC/GLA/LIP (OMEGA 3-6-9 PO) Take  by mouth two (2) times a day.  cinnamon bark (CINNAMON) 500 mg cap Take  by mouth two (2) times a day.     tiZANidine (ZANAFLEX) 2 mg tablet TK 1 T PO TID PRF BACK PAIN    olopatadine (PATANOL) 0.1 % ophthalmic solution INSTILL 1 DROP IN BOTH EYES BID    VIAGRA 100 mg tablet No current facility-administered medications for this visit. Allergies   Allergen Reactions    Jardiance [Empagliflozin] Other (comments)     Polyuria    Penicillins Hives     Review of Systems:  - Eyes: no blurry vision or double vision  - Cardiovascular: no chest pain  - Respiratory: no shortness of breath  - Musculoskeletal: no myalgias  - Neurological: no numbness/tingling in extremities    Physical Examination:  Blood pressure 121/79, pulse 93, height 5' 7\" (1.702 m), weight 201 lb 9.6 oz (91.4 kg). - General: pleasant, no distress, good eye contact   - Neck: no carotid bruits  - Cardiovascular: regular, normal rate, nl s1 and s2, no m/r/g, 2+ DP pulses   - Respiratory: clear bilaterally  - Integumentary: no edema, no foot ulcers,  - Psychiatric: normal mood and affect    Diabetic foot exam:     Left Foot:   Visual Exam: normal    Pulse DP: 2+ (normal)   Filament test: normal sensation    Vibratory sensation: normal      Right Foot:   Visual Exam: normal    Pulse DP: 2+ (normal)   Filament test: normal sensation    Vibratory sensation: normal    Data Reviewed:   His A1C today was 7.2%. Assessment/Plan:   1) DM > His A1C today was 7.2%. Pt encouraged to keep up the good work and continue the Metformin XR 1000mg BID, Actos 30mg HS, Glipizide 5mg BID and Trulicity 1.3ZE weekly. Pt to continue the Phentermine 18.75mg with dinner. Pt to check his BGs twice per day. 2) HTN > His BP is at goal on his current regimen and he is on an ARB for renal protection. 3) HLD > His lipid panel in January 2019 was at goal. Pt is tolerating his Simvastatin well. RTC 3 months    Pt voices understanding and agreement with the plan. Follow-up and Dispositions    · Return in about 3 months (around 11/26/2019).          Copy sent to:  Dr. Manny Bonner

## 2019-08-27 LAB
ALBUMIN/CREAT UR: 147.8 MG/G CREAT (ref 0–30)
CREAT UR-MCNC: 136.5 MG/DL
MICROALBUMIN UR-MCNC: 201.8 UG/ML

## 2019-09-17 RX ORDER — GLIPIZIDE 5 MG/1
TABLET ORAL
Qty: 180 TAB | Refills: 4 | Status: SHIPPED | OUTPATIENT
Start: 2019-09-17 | End: 2020-10-01

## 2019-09-17 RX ORDER — EMPAGLIFLOZIN 10 MG/1
TABLET, FILM COATED ORAL
Qty: 90 TAB | Refills: 4 | Status: SHIPPED | OUTPATIENT
Start: 2019-09-17 | End: 2019-12-05 | Stop reason: CLARIF

## 2019-09-17 RX ORDER — PIOGLITAZONEHYDROCHLORIDE 30 MG/1
TABLET ORAL
Qty: 90 TAB | Refills: 4 | Status: SHIPPED | OUTPATIENT
Start: 2019-09-17 | End: 2020-10-01

## 2019-10-01 RX ORDER — METFORMIN HYDROCHLORIDE 500 MG/1
TABLET, EXTENDED RELEASE ORAL
Qty: 360 TAB | Refills: 4 | Status: SHIPPED | OUTPATIENT
Start: 2019-10-01 | End: 2020-10-15

## 2019-11-26 DIAGNOSIS — E78.2 MIXED HYPERLIPIDEMIA: ICD-10-CM

## 2019-11-26 DIAGNOSIS — I10 ESSENTIAL HYPERTENSION: ICD-10-CM

## 2019-11-26 DIAGNOSIS — E11.21 TYPE 2 DIABETES MELLITUS WITH DIABETIC NEPHROPATHY, WITHOUT LONG-TERM CURRENT USE OF INSULIN (HCC): ICD-10-CM

## 2019-11-28 LAB
ALBUMIN SERPL-MCNC: 4.3 G/DL (ref 3.6–4.8)
ALBUMIN/GLOB SERPL: 1.6 {RATIO} (ref 1.2–2.2)
ALP SERPL-CCNC: 114 IU/L (ref 39–117)
ALT SERPL-CCNC: 22 IU/L (ref 0–44)
AST SERPL-CCNC: 21 IU/L (ref 0–40)
BILIRUB SERPL-MCNC: 0.3 MG/DL (ref 0–1.2)
BUN SERPL-MCNC: 16 MG/DL (ref 8–27)
BUN/CREAT SERPL: 12 (ref 10–24)
CALCIUM SERPL-MCNC: 9.3 MG/DL (ref 8.6–10.2)
CHLORIDE SERPL-SCNC: 106 MMOL/L (ref 96–106)
CHOLEST SERPL-MCNC: 151 MG/DL (ref 100–199)
CO2 SERPL-SCNC: 25 MMOL/L (ref 20–29)
CREAT SERPL-MCNC: 1.32 MG/DL (ref 0.76–1.27)
EST. AVERAGE GLUCOSE BLD GHB EST-MCNC: 157 MG/DL
GLOBULIN SER CALC-MCNC: 2.7 G/DL (ref 1.5–4.5)
GLUCOSE SERPL-MCNC: 111 MG/DL (ref 65–99)
HBA1C MFR BLD: 7.1 % (ref 4.8–5.6)
HDLC SERPL-MCNC: 66 MG/DL
INTERPRETATION, 910389: NORMAL
INTERPRETATION: NORMAL
LDLC SERPL CALC-MCNC: 65 MG/DL (ref 0–99)
Lab: NORMAL
PDF IMAGE, 910387: NORMAL
POTASSIUM SERPL-SCNC: 4.7 MMOL/L (ref 3.5–5.2)
PROT SERPL-MCNC: 7 G/DL (ref 6–8.5)
SODIUM SERPL-SCNC: 144 MMOL/L (ref 134–144)
TRIGL SERPL-MCNC: 102 MG/DL (ref 0–149)
VLDLC SERPL CALC-MCNC: 20 MG/DL (ref 5–40)

## 2019-12-05 ENCOUNTER — OFFICE VISIT (OUTPATIENT)
Dept: ENDOCRINOLOGY | Age: 64
End: 2019-12-05

## 2019-12-05 VITALS
HEART RATE: 99 BPM | BODY MASS INDEX: 32.55 KG/M2 | DIASTOLIC BLOOD PRESSURE: 79 MMHG | WEIGHT: 207.4 LBS | HEIGHT: 67 IN | SYSTOLIC BLOOD PRESSURE: 113 MMHG

## 2019-12-05 DIAGNOSIS — I10 ESSENTIAL HYPERTENSION: ICD-10-CM

## 2019-12-05 DIAGNOSIS — E11.21 TYPE 2 DIABETES MELLITUS WITH DIABETIC NEPHROPATHY, WITHOUT LONG-TERM CURRENT USE OF INSULIN (HCC): Primary | ICD-10-CM

## 2019-12-05 DIAGNOSIS — E78.2 MIXED HYPERLIPIDEMIA: ICD-10-CM

## 2019-12-05 RX ORDER — PHENTERMINE HYDROCHLORIDE 37.5 MG/1
TABLET ORAL
Qty: 100 TAB | Refills: 1 | Status: SHIPPED | OUTPATIENT
Start: 2019-12-05 | End: 2020-06-15

## 2019-12-05 NOTE — PROGRESS NOTES
Chief Complaint   Patient presents with    Diabetes     pcp and pharmacy verified   Records since last visit reviewed. History of Present Illness: Christiane Patrick is a 75 y.o. male here for follow up of diabetes. He was diagnosed with diabetes around 2005. His A1C last week was 7.1%. He notes he has been cutting his Jardiance 10mg in 1/2 because \"it makes me pee too much. \"  He also notes his AM appetite has been lower so he is not eating much in the morning. He is currently taking Metformin XR 1000mg BID, Actos 30mg HS, Glipizide 5mg BID, Jardiance 5mg (1/2 of a 86HI tablet) and Trulicity 7.7FE weekly. He has been taking his phentermine after dinner. He denies any recent illnesses, injuries or hospitalizations. He has not been checking his BGs. He had a shoulder injection recently. He has also been in PT for his right shoulder pain. His insurance formulary will require a change from [facility] to [place]. Pt is eating 3 meals daily  He has breakfast around 9AM, today he had a ham sandwich and a cup of coffee. He will occasionally have a snack in the mid-morning. He has lunch around 1PM, today he had a banana, but has not eaten lunch yet. He will have an afternoon snack of a fruit (cantelope and pineapple) or mixed nuts  He has dinner around 6-7PM, last night he had navy bean, a pork chop and water. His largest meal of the day is dinner. No history of vascular disease. No history of retinopathy or neuropathy, but does have nephropathy (history of positive microalbuminuria and CKD). Last eye exam was 8/20/19, no retinopathy, but \"she noted that my eye pressure was up and she wants to see me back in 2 months. \"      Pt denies any issues of numbness or burning in his feet. Pt saw Dr. Isadora Nelson of GI, he had EGD and colonoscopy and gastric emptying stomach. He was found to have slow emptying of his stomach (gastroparesis) and a hiatal hernia.    He saw Dr. Isadora Nelson and Tanika Escalante said my stomach was looking better\". He is scheduled for a colonoscopy in February 2020. His lipid panel was at goal in December 2019 and he has been tolerating his regimen of Simvastatin 20mg daily with no issues. Current Outpatient Medications   Medication Sig    dapagliflozin (FARXIGA) 5 mg tab tablet Take 1 Tab by mouth daily. Do not fill till January 2020 (formulary change)    phentermine (ADIPEX-P) 37.5 mg tablet Take 1/2 30 minutes before dinner    TRULICITY 1.5 YT/4.8 mL sub-q pen INJECT 0.5 ML (1 PEN) UNDER THE SKIN EVERY 7 DAYS    metFORMIN ER (GLUCOPHAGE XR) 500 mg tablet TAKE 2 TABLETS TWICE A DAY FOR DIABETES    JARDIANCE 10 mg tablet TAKE 1 TABLET DAILY FOR DIABETES (Patient taking differently: Takes 1/2 daily)    pioglitazone (ACTOS) 30 mg tablet TAKE 1 TABLET DAILY FOR DIABETES    glipiZIDE (GLUCOTROL) 5 mg tablet TAKE 1 TABLET TWICE A DAY WITH BREAKFAST AND DINNER    simvastatin (ZOCOR) 20 mg tablet TAKE 1 TABLET NIGHTLY FOR CHOLESTEROL    ONETOUCH VERIO strip USE TO CHECK BLOOD SUGAR THREE TIMES A DAY    olmesartan (BENICAR) 20 mg tablet TAKE 1 TABLET DAILY (Patient taking differently: 10 mg daily.)    apple cider vinegar 600 mg cap Take 1 Tab by mouth daily.  cyanocobalamin (VITAMIN B-12) 1,000 mcg tablet Take 1,000 mcg by mouth daily.  aspirin delayed-release 81 mg tablet Take  by mouth daily. Takes 2 daily    lansoprazole (PREVACID) 30 mg capsule daily.  ONETOUCH VERIO IQ METER misc USE TO CHECK BLOOD SUGAR THREE TIMES A DAY    mometasone (ELOCON) 0.1 % topical cream as needed.  VIAGRA 100 mg tablet as needed.  multivitamin (ONE A DAY) tablet Take 1 Tab by mouth daily.  OM-3/E/LINOL/ALA/OLEIC/GLA/LIP (OMEGA 3-6-9 PO) Take  by mouth two (2) times a day.  cinnamon bark (CINNAMON) 500 mg cap Take  by mouth two (2) times a day.     tiZANidine (ZANAFLEX) 2 mg tablet TK 1 T PO TID PRF BACK PAIN    olopatadine (PATANOL) 0.1 % ophthalmic solution INSTILL 1 DROP IN BOTH EYES BID     No current facility-administered medications for this visit. Allergies   Allergen Reactions    Jardiance [Empagliflozin] Other (comments)     Polyuria    Penicillins Hives     Review of Systems:  - Eyes: no blurry vision or double vision  - Cardiovascular: no chest pain  - Respiratory: no shortness of breath  - Musculoskeletal: no myalgias  - Neurological: no numbness/tingling in extremities    Physical Examination:  Blood pressure 113/79, pulse 99, height 5' 7\" (1.702 m), weight 207 lb 6.4 oz (94.1 kg). - General: pleasant, no distress, good eye contact   - Neck: no carotid bruits  - Cardiovascular: regular, normal rate, nl s1 and s2, no m/r/g, 2+ DP pulses   - Respiratory: clear bilaterally  - Integumentary: no edema, no foot ulcers,  - Psychiatric: normal mood and affect    Diabetic foot exam:     Left Foot:   Visual Exam: normal    Pulse DP: 2+ (normal)   Filament test: normal sensation    Vibratory sensation: normal      Right Foot:   Visual Exam: normal    Pulse DP: 2+ (normal)   Filament test: normal sensation    Vibratory sensation: normal    Data Reviewed:   Component      Latest Ref Rng & Units 11/27/2019 11/27/2019 11/27/2019           4:04 PM  4:04 PM  4:04 PM   Glucose      65 - 99 mg/dL   111 (H)   BUN      8 - 27 mg/dL   16   Creatinine      0.76 - 1.27 mg/dL   1.32 (H)   GFR est non-AA      >59 mL/min/1.73   56 (L)   GFR est AA      >59 mL/min/1.73   65   BUN/Creatinine ratio      10 - 24   12   Sodium      134 - 144 mmol/L   144   Potassium      3.5 - 5.2 mmol/L   4.7   Chloride      96 - 106 mmol/L   106   CO2      20 - 29 mmol/L   25   Calcium      8.6 - 10.2 mg/dL   9.3   Protein, total      6.0 - 8.5 g/dL   7.0   Albumin      3.6 - 4.8 g/dL   4.3   GLOBULIN, TOTAL      1.5 - 4.5 g/dL   2.7   A-G Ratio      1.2 - 2.2   1.6   Bilirubin, total      0.0 - 1.2 mg/dL   0.3   Alk.  phosphatase      39 - 117 IU/L   114   AST      0 - 40 IU/L   21   ALT (SGPT)      0 - 44 IU/L 22   Hemoglobin A1c, (calculated)      4.8 - 5.6 %  7.1 (H)    Estimated average glucose      mg/dL  157    PDF Image       Not applicable       Component      Latest Ref Rng & Units 11/27/2019           4:04 PM   Cholesterol, total      100 - 199 mg/dL 151   Triglyceride      0 - 149 mg/dL 102   HDL Cholesterol      >39 mg/dL 66   VLDL, calculated      5 - 40 mg/dL 20   LDL, calculated      0 - 99 mg/dL 65       Assessment/Plan:   1) DM > His A1C last week was down to 7.1%. Pt encouraged to keep up the good work and continue the Metformin XR 1000mg BID, Actos 30mg HS, Glipizide 5mg BID and Trulicity 2.1YM weekly. Pt to continue the Phentermine 18.75mg with dinner. Will change his Jardiance to Farxiga  Pt to check his BGs twice per day. 2) HTN > His BP is at goal on his current regimen and he is on an ARB for renal protection. 3) HLD > His lipid panel in December 2019 was at goal. Pt is tolerating his Simvastatin well. RTC 3 months    Pt voices understanding and agreement with the plan.             Copy sent to:  Dr. Clemens Pi

## 2020-02-04 DIAGNOSIS — E78.2 MIXED HYPERLIPIDEMIA: ICD-10-CM

## 2020-02-04 RX ORDER — SIMVASTATIN 20 MG/1
TABLET, FILM COATED ORAL
Qty: 90 TAB | Refills: 4 | Status: SHIPPED | OUTPATIENT
Start: 2020-02-04 | End: 2021-04-16

## 2020-03-18 DIAGNOSIS — E11.21 TYPE 2 DIABETES MELLITUS WITH DIABETIC NEPHROPATHY, WITHOUT LONG-TERM CURRENT USE OF INSULIN (HCC): Primary | ICD-10-CM

## 2020-03-18 DIAGNOSIS — I10 ESSENTIAL HYPERTENSION: ICD-10-CM

## 2020-03-23 LAB
ALBUMIN SERPL-MCNC: 4.4 G/DL (ref 3.8–4.8)
ALBUMIN/GLOB SERPL: 1.8 {RATIO} (ref 1.2–2.2)
ALP SERPL-CCNC: 114 IU/L (ref 39–117)
ALT SERPL-CCNC: 19 IU/L (ref 0–44)
AST SERPL-CCNC: 17 IU/L (ref 0–40)
BILIRUB SERPL-MCNC: 0.3 MG/DL (ref 0–1.2)
BUN SERPL-MCNC: 21 MG/DL (ref 8–27)
BUN/CREAT SERPL: 16 (ref 10–24)
CALCIUM SERPL-MCNC: 9.6 MG/DL (ref 8.6–10.2)
CHLORIDE SERPL-SCNC: 101 MMOL/L (ref 96–106)
CO2 SERPL-SCNC: 23 MMOL/L (ref 20–29)
CREAT SERPL-MCNC: 1.28 MG/DL (ref 0.76–1.27)
EST. AVERAGE GLUCOSE BLD GHB EST-MCNC: 174 MG/DL
GLOBULIN SER CALC-MCNC: 2.4 G/DL (ref 1.5–4.5)
GLUCOSE SERPL-MCNC: 161 MG/DL (ref 65–99)
HBA1C MFR BLD: 7.7 % (ref 4.8–5.6)
INTERPRETATION: NORMAL
Lab: NORMAL
POTASSIUM SERPL-SCNC: 4.8 MMOL/L (ref 3.5–5.2)
PROT SERPL-MCNC: 6.8 G/DL (ref 6–8.5)
SODIUM SERPL-SCNC: 142 MMOL/L (ref 134–144)

## 2020-03-26 ENCOUNTER — VIRTUAL VISIT (OUTPATIENT)
Dept: ENDOCRINOLOGY | Age: 65
End: 2020-03-26

## 2020-03-26 DIAGNOSIS — E78.2 MIXED HYPERLIPIDEMIA: ICD-10-CM

## 2020-03-26 DIAGNOSIS — I10 ESSENTIAL HYPERTENSION: ICD-10-CM

## 2020-03-26 DIAGNOSIS — E11.21 TYPE 2 DIABETES MELLITUS WITH DIABETIC NEPHROPATHY, WITHOUT LONG-TERM CURRENT USE OF INSULIN (HCC): Primary | ICD-10-CM

## 2020-03-26 NOTE — PROGRESS NOTES
Chief Complaint   Patient presents with    Diabetes     pcp and pharmacy verified. Eye exam UTD     [facility]   Records since last visit reviewed. History of Present Illness: Kiera Esteves is a 75 y.o. male here for follow up of diabetes. He was diagnosed with diabetes around 2005. His A1C last week was 7.7%    He is currently taking Metformin XR 1000mg BID, Actos 30mg HS, Glipizide 5mg BID, He is still taking Jardiance 5mg dailiy and Trulicity 7.3UI weekly. He has not run out of the Jardiance so he wants to use it up first. (His insurance formulary will require a change from [employer] to [employer].)  He has been taking his phentermine after dinner. He denies any recent illnesses, injuries or hospitalizations. He had his last steroid injection in his shoulder about 2-3 months ago. He has not been checking his BGs. Pt is eating 3 meals daily  He has breakfast on the weekends only. He will have coffee with creamer only. He will occasionally have a snack in the mid-morning. He has lunch around 1PM, today he had 3 chicken wings, 3 slices of bread and water. He has dinner around 5-6PM, last night he had navy bean, 3 chicken wings, 5-6 biscuits. He notes he has been eating pastries and bread. No history of vascular disease. No history of retinopathy or neuropathy, but does have nephropathy (history of positive microalbuminuria and CKD). Last eye exam was 8/20/19, no retinopathy, but \"she noted that my eye pressure was up and she wants to see me back in 2 months. \"    He is taking drops for his eye pressure. Pt denies any issues of numbness or burning in his feet. Pt saw Dr. Ana Guaman of GI, he had EGD and colonoscopy and gastric emptying stomach. He was found to have slow emptying of his stomach (gastroparesis) and a hiatal hernia. He saw Dr. Ana Guaman and Thuy Hull said my stomach was looking better\". He is scheduled for a colonoscopy in February 2020.     His lipid panel was at goal in December 2019 and he has been tolerating his regimen of Simvastatin 20mg daily with no issues. Current Outpatient Medications   Medication Sig    simvastatin (ZOCOR) 20 mg tablet TAKE 1 TABLET NIGHTLY FOR CHOLESTEROL    phentermine (ADIPEX-P) 37.5 mg tablet Take 1/2 30 minutes before dinner    dapagliflozin (FARXIGA) 5 mg tab tablet Take 1 Tab by mouth daily. Do not fill till January 2020 (formulary change)    TRULICITY 1.5 CD/1.2 mL sub-q pen INJECT 0.5 ML (1 PEN) UNDER THE SKIN EVERY 7 DAYS    metFORMIN ER (GLUCOPHAGE XR) 500 mg tablet TAKE 2 TABLETS TWICE A DAY FOR DIABETES    pioglitazone (ACTOS) 30 mg tablet TAKE 1 TABLET DAILY FOR DIABETES    glipiZIDE (GLUCOTROL) 5 mg tablet TAKE 1 TABLET TWICE A DAY WITH BREAKFAST AND DINNER    ONETOUCH VERIO strip USE TO CHECK BLOOD SUGAR THREE TIMES A DAY    olmesartan (BENICAR) 20 mg tablet TAKE 1 TABLET DAILY (Patient taking differently: 10 mg daily.)    apple cider vinegar 600 mg cap Take 1 Tab by mouth daily.  cyanocobalamin (VITAMIN B-12) 1,000 mcg tablet Take 1,000 mcg by mouth daily.  aspirin delayed-release 81 mg tablet Take  by mouth daily. Takes 2 daily    lansoprazole (PREVACID) 30 mg capsule daily.  ONETOUCH VERIO IQ METER misc USE TO CHECK BLOOD SUGAR THREE TIMES A DAY    mometasone (ELOCON) 0.1 % topical cream as needed.  VIAGRA 100 mg tablet as needed.  multivitamin (ONE A DAY) tablet Take 1 Tab by mouth daily.  OM-3/E/LINOL/ALA/OLEIC/GLA/LIP (OMEGA 3-6-9 PO) Take  by mouth two (2) times a day.  cinnamon bark (CINNAMON) 500 mg cap Take  by mouth two (2) times a day.  tiZANidine (ZANAFLEX) 2 mg tablet TK 1 T PO TID PRF BACK PAIN    olopatadine (PATANOL) 0.1 % ophthalmic solution INSTILL 1 DROP IN BOTH EYES BID     No current facility-administered medications for this visit.       Allergies   Allergen Reactions    Diclofenac Itching    Gabapentin Hives    Jardiance [Empagliflozin] Other (comments) Polyuria    Penicillins Hives     Review of Systems:  - Eyes: no blurry vision or double vision  - Cardiovascular: no chest pain  - Respiratory: no shortness of breath  - Musculoskeletal: no myalgias  - Neurological: no numbness/tingling in extremities    Physical Examination:  There were no vitals taken for this visit. -   An Díaz is a 75 y.o. male who was seen by synchronous (real-time) audio-video technology on 3/26/2020. Consent:  He and/or his healthcare decision maker is aware that this patient-initiated Telehealth encounter is a billable service, with coverage as determined by his insurance carrier. He is aware that he may receive a bill and has provided verbal consent to proceed: Yes    I was in the office while conducting this encounter. Assessment & Plan:   Diagnoses and all orders for this visit:    1. Type 2 diabetes mellitus with diabetic nephropathy, without long-term current use of insulin ([facility] [address]    2. Essential hypertension    3. Mixed hyperlipidemia      Data Reviewed:   Component      Latest Ref Rng & Units 3/21/2020 3/21/2020           8:04 AM  8:04 AM   Glucose      65 - 99 mg/dL  161 (H)   BUN      8 - 27 mg/dL  21   Creatinine      0.76 - 1.27 mg/dL  1.28 (H)   GFR est non-AA      >59 mL/min/1.73  58 (L)   GFR est AA      >59 mL/min/1.73  67   BUN/Creatinine ratio      10 - 24  16   Sodium      134 - 144 mmol/L  142   Potassium      3.5 - 5.2 mmol/L  4.8   Chloride      96 - 106 mmol/L  101   CO2      20 - 29 mmol/L  23   Calcium      8.6 - 10.2 mg/dL  9.6   Protein, total      6.0 - 8.5 g/dL  6.8   Albumin      3.8 - 4.8 g/dL  4.4   GLOBULIN, TOTAL      1.5 - 4.5 g/dL  2.4   A-G Ratio      1.2 - 2.2  1.8   Bilirubin, total      0.0 - 1.2 mg/dL  0.3   Alk.  phosphatase      39 - 117 IU/L  114   AST      0 - 40 IU/L  17   ALT (SGPT)      0 - 44 IU/L  19   Hemoglobin A1c, (calculated)      4.8 - 5.6 % 7.7 (H)    Estimated average glucose      mg/dL 174        Assessment/Plan: 1) DM > His A1C last week was 7.7%. Pt to continue the Metformin XR 1000mg BID, Actos 30mg HS, Glipizide 5mg BID and Trulicity 3.5GS weekly and Farxiga 5mg daily. Pt to continue the Phentermine 18.75mg with dinner. Pt to check his BGs twice per day. Pt encouraged to stop eating so much bread, biscuits and pastries. 2) HTN > He is on an ARB for renal protection. 3) HLD > His lipid panel in December 2019 was at goal. Pt is tolerating his Simvastatin well. RTC 4 months    Pt voices understanding and agreement with the plan. Subjective: Jayro Rojas was seen for Diabetes (pcp and pharmacy verified. Eye exam UTD     [facility])      Prior to Admission medications    Medication Sig Start Date End Date Taking? Authorizing Provider   simvastatin (ZOCOR) 20 mg tablet TAKE 1 TABLET NIGHTLY FOR CHOLESTEROL 2/4/20  Yes Mell Washington MD   phentermine (ADIPEX-P) 37.5 mg tablet Take 1/2 30 minutes before dinner 12/5/19  Yes Mell Washington MD   dapagliflozin (FARXIGA) 5 mg tab tablet Take 1 Tab by mouth daily. Do not fill till January 2020 (formulary change) 12/5/19  Yes Mell Washington MD   TRULICITY 1.5 BQ/6.0 mL sub-q pen INJECT 0.5 ML (1 PEN) UNDER THE SKIN EVERY 7 DAYS 10/18/19  Yes Mell Washington MD   metFORMIN ER (GLUCOPHAGE XR) 500 mg tablet TAKE 2 TABLETS TWICE A DAY FOR DIABETES 10/1/19  Yes Mell Washington MD   pioglitazone (ACTOS) 30 mg tablet TAKE 1 TABLET DAILY FOR DIABETES 9/17/19  Yes Mell Washington MD   glipiZIDE (GLUCOTROL) 5 mg tablet TAKE 1 TABLET TWICE A DAY WITH BREAKFAST AND DINNER 9/17/19  Yes Mell Wasihngton MD   [facility] VERIO strip USE TO CHECK BLOOD SUGAR THREE TIMES A DAY 1/14/19  Yes Mell Washington MD   olmesartan (BENICAR) 20 mg tablet TAKE 1 TABLET DAILY  Patient taking differently: 10 mg daily. 1/14/19  Yes Mell Washington MD   apple cider vinegar 600 mg cap Take 1 Tab by mouth daily.    Yes Provider, Historical   cyanocobalamin (VITAMIN B-12) 1,000 mcg tablet Take 1,000 mcg by mouth daily. Yes Provider, Historical   aspirin delayed-release 81 mg tablet Take  by mouth daily. Takes 2 daily   Yes Provider, Historical   lansoprazole (PREVACID) 30 mg capsule daily. 11/15/17  Yes Provider, Historical   ONETOUCH VERIO IQ METER misc USE TO CHECK BLOOD SUGAR THREE TIMES A DAY 7/10/17  Yes Franko MD Mini   mometasone (ELOCON) 0.1 % topical cream as needed. 12/16/16  Yes Provider, Historical   VIAGRA 100 mg tablet as needed. 5/22/16  Yes Provider, Historical   multivitamin (ONE A DAY) tablet Take 1 Tab by mouth daily. Yes Provider, Historical   OM-3/E/LINOL/ALA/OLEIC/GLA/LIP (OMEGA 3-6-9 PO) Take  by mouth two (2) times a day. Yes Provider, Historical   cinnamon bark (CINNAMON) 500 mg cap Take  by mouth two (2) times a day. Yes Provider, Historical   tiZANidine (ZANAFLEX) 2 mg tablet TK 1 T PO TID PRF BACK PAIN 5/4/19   Provider, Historical   olopatadine (PATANOL) 0.1 % ophthalmic solution INSTILL 1 DROP IN BOTH EYES BID 9/22/18   Provider, Historical     Allergies   Allergen Reactions    Diclofenac Itching    Gabapentin Hives    Jardiance [Empagliflozin] Other (comments)     Polyuria    Penicillins Hives           Review of Systems     PHYSICAL EXAMINATION:  [ INSTRUCTIONS:  \"[x]\" Indicates a positive item  \"[]\" Indicates a negative item  -- DELETE ALL ITEMS NOT EXAMINED]  Vital Signs: (As obtained by patient/caregiver at home)  There were no vitals taken for this visit.      Constitutional: [x] Appears well-developed and well-nourished [x] No apparent distress      [] Abnormal -     Mental status: [x] Alert and awake  [x] Oriented to person/place/time [x] Able to follow commands    [] Abnormal -     Eyes:   EOM    [x]  Normal    [] Abnormal -   Sclera  [x]  Normal    [] Abnormal -          Discharge [x]  None visible   [] Abnormal -     HENT: [x] Normocephalic, atraumatic  [] Abnormal -   [x] Mouth/Throat: Mucous membranes are moist    External Ears [x] Normal  [] Abnormal -    Neck: [x] No visualized mass [] Abnormal -     Pulmonary/Chest: [x] Respiratory effort normal   [x] No visualized signs of difficulty breathing or respiratory distress        [] Abnormal -      Musculoskeletal:   [] Normal gait with no signs of ataxia         [x] Normal range of motion of neck        [] Abnormal -     Neurological:        [x] No Facial Asymmetry (Cranial nerve 7 motor function) (limited exam due to video visit)          [x] No gaze palsy        [] Abnormal -          Skin:        [x] No significant exanthematous lesions or discoloration noted on facial skin         [] Abnormal -            Psychiatric:       [x] Normal Affect [] Abnormal -        [x] No Hallucinations    Other pertinent observable physical exam findings:-        We discussed the expected course, resolution and complications of the diagnosis(es) in detail. Medication risks, benefits, costs, interactions, and alternatives were discussed as indicated. I advised him to contact the office if his condition worsens, changes or fails to improve as anticipated. He expressed understanding with the diagnosis(es) and plan. Pursuant to the emergency declaration under the [facility], [facility] waiver authority and the [employer] and [employer] Act, this Virtual  Visit was conducted, with patient's consent, to reduce the patient's risk of exposure to COVID-19 and provide continuity of care for an established patient. Services were provided through a video synchronous discussion virtually to substitute for in-person clinic visit.     Evangelist Freeman MD      Copy sent to:  Dr. Sameer Alberto

## 2020-05-14 RX ORDER — OLMESARTAN MEDOXOMIL 20 MG/1
TABLET ORAL
Qty: 90 TAB | Refills: 3 | Status: SHIPPED | OUTPATIENT
Start: 2020-05-14 | End: 2021-07-29 | Stop reason: SDUPTHER

## 2020-05-21 ENCOUNTER — ANESTHESIA EVENT (OUTPATIENT)
Dept: ENDOSCOPY | Age: 65
End: 2020-05-21
Payer: MEDICARE

## 2020-05-21 NOTE — ANESTHESIA PREPROCEDURE EVALUATION
Anesthetic History   No history of anesthetic complications            Review of Systems / Medical History  Patient summary reviewed, nursing notes reviewed and pertinent labs reviewed    Pulmonary  Within defined limits                 Neuro/Psych   Within defined limits           Cardiovascular    Hypertension          Hyperlipidemia    Exercise tolerance: >4 METS     GI/Hepatic/Renal     GERD    Renal disease: CRI  Hiatal hernia    Comments: GASTROPARESIS   DIAPHRAGMATIC HERNIA WITHOUT OBSTRUCTION       Screening colonoscopy Endo/Other    Diabetes: type 2    Obesity     Other Findings            Physical Exam    Airway  Mallampati: III  TM Distance: > 6 cm  Neck ROM: normal range of motion   Mouth opening: Normal     Cardiovascular  Regular rate and rhythm,  S1 and S2 normal,  no murmur, click, rub, or gallop             Dental      Comments: No loose teeth, some chipped   Pulmonary  Breath sounds clear to auscultation               Abdominal  GI exam deferred       Other Findings            Anesthetic Plan    ASA: 3  Anesthesia type: total IV anesthesia          Induction: Intravenous  Anesthetic plan and risks discussed with: Patient      preop glucose

## 2020-05-22 ENCOUNTER — HOSPITAL ENCOUNTER (OUTPATIENT)
Age: 65
Setting detail: OUTPATIENT SURGERY
Discharge: HOME | End: 2020-05-22
Attending: SPECIALIST | Admitting: SPECIALIST
Payer: MEDICARE

## 2020-05-22 ENCOUNTER — ANESTHESIA (OUTPATIENT)
Dept: ENDOSCOPY | Age: 65
End: 2020-05-22
Payer: MEDICARE

## 2020-05-22 VITALS
SYSTOLIC BLOOD PRESSURE: 115 MMHG | RESPIRATION RATE: 17 BRPM | HEART RATE: 88 BPM | HEIGHT: 66 IN | WEIGHT: 204 LBS | TEMPERATURE: 97.7 F | DIASTOLIC BLOOD PRESSURE: 79 MMHG | BODY MASS INDEX: 32.78 KG/M2 | OXYGEN SATURATION: 100 %

## 2020-05-22 LAB
GLUCOSE BLD STRIP.AUTO-MCNC: 115 MG/DL (ref 65–100)
SERVICE CMNT-IMP: ABNORMAL

## 2020-05-22 PROCEDURE — 77030013992 HC SNR POLYP ENDOSC BSC -B: Performed by: SPECIALIST

## 2020-05-22 PROCEDURE — 82962 GLUCOSE BLOOD TEST: CPT

## 2020-05-22 PROCEDURE — 76040000007: Performed by: SPECIALIST

## 2020-05-22 PROCEDURE — 74011000250 HC RX REV CODE- 250: Performed by: ANESTHESIOLOGY

## 2020-05-22 PROCEDURE — 74011250636 HC RX REV CODE- 250/636: Performed by: SPECIALIST

## 2020-05-22 PROCEDURE — 74011250636 HC RX REV CODE- 250/636: Performed by: ANESTHESIOLOGY

## 2020-05-22 PROCEDURE — 76060000032 HC ANESTHESIA 0.5 TO 1 HR: Performed by: SPECIALIST

## 2020-05-22 PROCEDURE — 88305 TISSUE EXAM BY PATHOLOGIST: CPT

## 2020-05-22 RX ORDER — SODIUM CHLORIDE 9 MG/ML
50 INJECTION, SOLUTION INTRAVENOUS CONTINUOUS
Status: DISCONTINUED | OUTPATIENT
Start: 2020-05-22 | End: 2020-05-22 | Stop reason: HOSPADM

## 2020-05-22 RX ORDER — SODIUM CHLORIDE 0.9 % (FLUSH) 0.9 %
5-40 SYRINGE (ML) INJECTION AS NEEDED
Status: DISCONTINUED | OUTPATIENT
Start: 2020-05-22 | End: 2020-05-22 | Stop reason: HOSPADM

## 2020-05-22 RX ORDER — LIDOCAINE HYDROCHLORIDE 20 MG/ML
INJECTION, SOLUTION EPIDURAL; INFILTRATION; INTRACAUDAL; PERINEURAL AS NEEDED
Status: DISCONTINUED | OUTPATIENT
Start: 2020-05-22 | End: 2020-05-22 | Stop reason: HOSPADM

## 2020-05-22 RX ORDER — ONDANSETRON 2 MG/ML
4 INJECTION INTRAMUSCULAR; INTRAVENOUS AS NEEDED
Status: DISCONTINUED | OUTPATIENT
Start: 2020-05-22 | End: 2020-05-22 | Stop reason: HOSPADM

## 2020-05-22 RX ORDER — SODIUM CHLORIDE 0.9 % (FLUSH) 0.9 %
5-40 SYRINGE (ML) INJECTION EVERY 8 HOURS
Status: DISCONTINUED | OUTPATIENT
Start: 2020-05-22 | End: 2020-05-22 | Stop reason: HOSPADM

## 2020-05-22 RX ORDER — FENTANYL CITRATE 50 UG/ML
25 INJECTION, SOLUTION INTRAMUSCULAR; INTRAVENOUS
Status: DISCONTINUED | OUTPATIENT
Start: 2020-05-22 | End: 2020-05-22 | Stop reason: HOSPADM

## 2020-05-22 RX ORDER — SODIUM CHLORIDE, SODIUM LACTATE, POTASSIUM CHLORIDE, CALCIUM CHLORIDE 600; 310; 30; 20 MG/100ML; MG/100ML; MG/100ML; MG/100ML
25 INJECTION, SOLUTION INTRAVENOUS CONTINUOUS
Status: DISCONTINUED | OUTPATIENT
Start: 2020-05-22 | End: 2020-05-22 | Stop reason: HOSPADM

## 2020-05-22 RX ORDER — PROPOFOL 10 MG/ML
INJECTION, EMULSION INTRAVENOUS AS NEEDED
Status: DISCONTINUED | OUTPATIENT
Start: 2020-05-22 | End: 2020-05-22 | Stop reason: HOSPADM

## 2020-05-22 RX ORDER — DEXTROMETHORPHAN/PSEUDOEPHED 2.5-7.5/.8
1.2 DROPS ORAL
Status: DISCONTINUED | OUTPATIENT
Start: 2020-05-22 | End: 2020-05-22 | Stop reason: HOSPADM

## 2020-05-22 RX ORDER — LIDOCAINE HYDROCHLORIDE 10 MG/ML
0.1 INJECTION, SOLUTION EPIDURAL; INFILTRATION; INTRACAUDAL; PERINEURAL AS NEEDED
Status: DISCONTINUED | OUTPATIENT
Start: 2020-05-22 | End: 2020-05-22 | Stop reason: HOSPADM

## 2020-05-22 RX ORDER — DIPHENHYDRAMINE HYDROCHLORIDE 50 MG/ML
12.5 INJECTION, SOLUTION INTRAMUSCULAR; INTRAVENOUS AS NEEDED
Status: DISCONTINUED | OUTPATIENT
Start: 2020-05-22 | End: 2020-05-22 | Stop reason: HOSPADM

## 2020-05-22 RX ADMIN — SODIUM CHLORIDE 50 ML/HR: 900 INJECTION, SOLUTION INTRAVENOUS at 08:32

## 2020-05-22 RX ADMIN — PROPOFOL 260 MG: 10 INJECTION, EMULSION INTRAVENOUS at 09:17

## 2020-05-22 RX ADMIN — LIDOCAINE HYDROCHLORIDE 40 MG: 20 INJECTION, SOLUTION EPIDURAL; INFILTRATION; INTRACAUDAL; PERINEURAL at 08:51

## 2020-05-22 NOTE — DISCHARGE INSTRUCTIONS
Yadira [facility]  [id number]  1955    COLON DISCHARGE INSTRUCTIONS  Discomfort:  Redness at IV site- apply warm compress to area; if redness or soreness persist- contact your physician  There may be a slight amount of blood passed from the rectum  Gaseous discomfort- walking, belching will help relieve any discomfort  You may not operate a vehicle for 12 hours  You may not engage in an occupation involving machinery or appliances for rest of today  You may not drink alcoholic beverages for at least 12 hours  Avoid making any critical decisions for at least 24 hour  DIET:   Regular diet. - however -  remember your colon is empty and a heavy meal will produce gas. Avoid these foods:  vegetables, fried / greasy foods, carbonated drinks for today. MEDICATIONS:        Regarding Aspirin or Nonsteroidal medications, please see below. ACTIVITY:  You may resume your normal daily activities it is recommended that you spend the remainder of the day resting -  avoid any strenuous activity. CALL M.D. ANY SIGN OF:  Increasing pain, nausea, vomiting  Abdominal distension (swelling)  New increased bleeding (oral or rectal)  Fever (chills)  Pain in chest area  Bloody discharge from nose or mouth  Shortness of breath    ONLY  Tylenol as needed for pain.       Follow-up Instructions:   Call Dr. James Jordan for results of procedure / biopsy in 4-5 days at telephone #  [phone]              Repeat Colonoscopy in 3 years

## 2020-05-22 NOTE — PROCEDURES
Colonoscopy Procedure Note    Indications:   Screening colonoscopy    Referring Physician: Brodie Douglas MD  Anesthesia/Sedation: MAC anesthesia Propofol  Endoscopist:  Dr. Neel Mei    Procedure in Detail:  Informed consent was obtained for the procedure, including sedation. Risks of perforation, hemorrhage, adverse drug reaction, and aspiration were discussed. The patient was placed in the left lateral decubitus position. Based on the pre-procedure assessment, including review of the patient's medical history, medications, allergies, and review of systems, he had been deemed to be an appropriate candidate for moderate sedation; he was therefore sedated with the medications listed above. The patient was monitored continuously with ECG tracing, pulse oximetry, blood pressure monitoring, and direct observations. A rectal examination was performed. The [id number] was inserted into the rectum and advanced under direct vision to the terminal ileum. The quality of the colonic preparation was adequate. A careful inspection was made as the colonoscope was withdrawn, including a retroflexed view of the rectum; findings and interventions are described below. Appropriate photodocumentation was obtained. Findings:     1. Scope advanced to the cecum and terminal ileum. 2.  Mucosa was normal throughout. 3.  Diffuse wide mouthed diverticulosis. 4.  Sessile 5 mm polyp in the proximal ascending colon s/p cold snare removal.       Semi pedunculated 1 cm polyp in the mid transverse colon s/p hot snare removal.  5.  Medium sized internal hemorrhoids. Therapies: as above    Specimen: Specimens were collected as described above and sent to pathology. Complications: None were encountered during the procedure. EBL: < 10 ml. Recommendations:     - Repeat colonoscopy in 3 years.     Signed By: Angel Gil MD                        May 22, 2020

## 2020-05-22 NOTE — H&P
Gastroenterology Outpatient History and Physical    Patient: Andrez Stephens    Physician: Mandy Renae MD    Vital Signs: Blood pressure 111/77, pulse 94, temperature 97.9 °F (36.6 °C), resp. rate 18, height 5' 6\" (1.676 m), weight 92.5 kg (204 lb), SpO2 99 %. Allergies: Allergies   Allergen Reactions    Diclofenac Itching    Gabapentin Hives    Jardiance [Empagliflozin] Other (comments)     Polyuria    Penicillins Hives       Chief Complaint: Screening colonoscopy    History of Present Illness: 74 yo BM for CRC screening    Justification for Procedure: above    History:  Past Medical History:   Diagnosis Date    Diabetes ([facility] [address]     High cholesterol     Hypertension       Past Surgical History:   Procedure Laterality Date    HX COLONOSCOPY      HX ENDOSCOPY        Social History     Socioeconomic History    Marital status:      Spouse name: Not on file    Number of children: Not on file    Years of education: Not on file    Highest education level: Not on file   Tobacco Use    Smoking status: Never Smoker    Smokeless tobacco: Never Used   Substance and Sexual Activity    Alcohol use: Yes     Comment: Rare    Drug use: No      Family History   Problem Relation Age of Onset    Diabetes Mother     Thyroid Disease Mother     Stroke Father     Diabetes Sister     No Known Problems Sister     Diabetes Maternal Grandmother     No Known Problems Brother     No Known Problems Brother     No Known Problems Brother     No Known Problems Brother     No Known Problems Brother        Medications:   Prior to Admission medications    Medication Sig Start Date End Date Taking?  Authorizing Provider   olmesartan (BENICAR) 20 mg tablet TAKE 1 TABLET DAILY 5/14/20  Yes Efra Quevedo MD   simvastatin (ZOCOR) 20 mg tablet TAKE 1 TABLET NIGHTLY FOR CHOLESTEROL 2/4/20  Yes Efra Quevedo MD   phentermine (ADIPEX-P) 37.5 mg tablet Take 1/2 30 minutes before dinner 12/5/19  Yes Iraida Joy Gonzalez MD   dapagliflozin (FARXIGA) 5 mg tab tablet Take 1 Tab by mouth daily. Do not fill till January 2020 (formulary change) 12/5/19  Yes Nicola Lopez MD   TRULICITY 1.5 CV/8.6 mL sub-q pen INJECT 0.5 ML (1 PEN) UNDER THE SKIN EVERY 7 DAYS 10/18/19  Yes Nicola Lopez MD   metFORMIN ER (GLUCOPHAGE XR) 500 mg tablet TAKE 2 TABLETS TWICE A DAY FOR DIABETES 10/1/19  Yes Nicola Lopez MD   pioglitazone (ACTOS) 30 mg tablet TAKE 1 TABLET DAILY FOR DIABETES 9/17/19  Yes Nicola Lopez MD   glipiZIDE (GLUCOTROL) 5 mg tablet TAKE 1 TABLET TWICE A DAY WITH BREAKFAST AND DINNER 9/17/19  Yes Nicola Lopez MD   tiZANidine (ZANAFLEX) 2 mg tablet TK 1 T PO TID PRF BACK PAIN 5/4/19  Yes Provider, Historical   apple cider vinegar 600 mg cap Take 1 Tab by mouth daily. Yes Provider, Historical   cyanocobalamin (VITAMIN B-12) 1,000 mcg tablet Take 1,000 mcg by mouth daily. Yes Provider, Historical   olopatadine (PATANOL) 0.1 % ophthalmic solution INSTILL 1 DROP IN BOTH EYES BID 9/22/18  Yes Provider, Historical   aspirin delayed-release 81 mg tablet Take  by mouth daily. Takes 2 daily   Yes Provider, Historical   lansoprazole (PREVACID) 30 mg capsule daily. 11/15/17  Yes Provider, Historical   ONETOUCH VERIO IQ METER misc USE TO CHECK BLOOD SUGAR THREE TIMES A DAY 7/10/17  Yes Nicola Lopez MD   mometasone (ELOCON) 0.1 % topical cream as needed. 12/16/16  Yes Provider, Historical   multivitamin (ONE A DAY) tablet Take 1 Tab by mouth daily. Yes Provider, Historical   OM-3/E/LINOL/ALA/OLEIC/GLA/LIP (OMEGA 3-6-9 PO) Take  by mouth two (2) times a day. Yes Provider, Historical   cinnamon bark (CINNAMON) 500 mg cap Take  by mouth two (2) times a day. Yes Provider, Historical   ONETOUCH VERIO strip USE TO CHECK BLOOD SUGAR THREE TIMES A DAY 1/14/19   Nicola Lopez MD   VIAGRA 100 mg tablet as needed.  5/22/16   Provider, Historical       Physical Exam:   General: alert, no distress   HEENT: Head: Normocephalic, no lesions, without obvious abnormality.    Heart: regular rate and rhythm, S1, S2 normal, no murmur, click, rub or gallop   Lungs: chest clear, no wheezing, rales, normal symmetric air entry   Abdominal: soft, NT/ND +BS   Neurological: Grossly normal   Extremities: extremities normal, atraumatic, no cyanosis or edema     Findings/Diagnosis: Screening Colonoscopy    Plan of Care/Planned Procedure: Colonoscopy

## 2020-05-22 NOTE — ANESTHESIA POSTPROCEDURE EVALUATION
Procedure(s):  COLONOSCOPY  ENDOSCOPIC POLYPECTOMY. total IV anesthesia    Anesthesia Post Evaluation        Patient location during evaluation: PACU  Note status: Adequate. Level of consciousness: responsive to verbal stimuli and sleepy but conscious  Pain management: satisfactory to patient  Airway patency: patent  Anesthetic complications: no  Cardiovascular status: acceptable  Respiratory status: acceptable  Hydration status: acceptable  Comments: +Post-Anesthesia Evaluation and Assessment    Patient: Sara Waters MRN: [id number]  SSN: [id number]   YOB: 1955  Age: 75 y.o. Sex: male      Cardiovascular Function/Vital Signs    /79   Pulse 88   Temp 36.5 °C (97.7 °F)   Resp 17   Ht 5' 6\" (1.676 m)   Wt 92.5 kg (204 lb)   SpO2 100%   BMI 32.93 kg/m²     Patient is status post Procedure(s):  COLONOSCOPY  ENDOSCOPIC POLYPECTOMY. Nausea/Vomiting: Controlled. Postoperative hydration reviewed and adequate. Pain:  Pain Scale 1: Numeric (0 - 10) (05/22/20 0955)  Pain Intensity 1: 0 (05/22/20 0955)   Managed. Neurological Status: At baseline. Mental Status and Level of Consciousness: Arousable. Pulmonary Status:   O2 Device: Room air (05/22/20 0950)   Adequate oxygenation and airway patent. Complications related to anesthesia: None    Post-anesthesia assessment completed. No concerns.     Signed By: Kasey Escobar DO    5/22/2020  Post anesthesia nausea and vomiting:  controlled      Vitals Value Taken Time   /79 5/22/2020  9:50 AM   Temp 36.5 °C (97.7 °F) 5/22/2020  9:30 AM   Pulse 88 5/22/2020  9:50 AM   Resp 17 5/22/2020  9:50 AM   SpO2 100 % 5/22/2020  9:50 AM

## 2020-05-22 NOTE — PERIOP NOTES
Anesthesia reports 260mg Propofol, 40mg Lidocaine and 450mL NS given during procedure. Received report from anesthesia staff on vital signs and status of patient. Endoscope was pre-cleaned at the bedside immediately following procedure by Ruby CONRAD

## 2020-10-01 RX ORDER — PIOGLITAZONEHYDROCHLORIDE 30 MG/1
TABLET ORAL
Qty: 90 TAB | Refills: 3 | Status: SHIPPED | OUTPATIENT
Start: 2020-10-01 | End: 2021-10-22

## 2020-10-01 RX ORDER — GLIPIZIDE 5 MG/1
TABLET ORAL
Qty: 180 TAB | Refills: 3 | Status: SHIPPED | OUTPATIENT
Start: 2020-10-01 | End: 2021-10-22

## 2020-10-15 RX ORDER — METFORMIN HYDROCHLORIDE 500 MG/1
TABLET, EXTENDED RELEASE ORAL
Qty: 360 TAB | Refills: 3 | Status: SHIPPED | OUTPATIENT
Start: 2020-10-15 | End: 2021-10-22

## 2020-11-27 DIAGNOSIS — E11.21 TYPE 2 DIABETES MELLITUS WITH DIABETIC NEPHROPATHY, WITHOUT LONG-TERM CURRENT USE OF INSULIN (HCC): Primary | ICD-10-CM

## 2020-11-27 DIAGNOSIS — E78.2 MIXED HYPERLIPIDEMIA: ICD-10-CM

## 2020-11-27 DIAGNOSIS — I10 ESSENTIAL HYPERTENSION: ICD-10-CM

## 2020-12-07 RX ORDER — DULAGLUTIDE 1.5 MG/.5ML
INJECTION, SOLUTION SUBCUTANEOUS
Qty: 6 ML | Refills: 3 | Status: SHIPPED | OUTPATIENT
Start: 2020-12-07 | End: 2021-11-22

## 2020-12-14 LAB
ALBUMIN SERPL-MCNC: 4.6 G/DL (ref 3.8–4.8)
ALBUMIN/CREAT UR: 203 MG/G CREAT (ref 0–29)
ALBUMIN/GLOB SERPL: 1.9 {RATIO} (ref 1.2–2.2)
ALP SERPL-CCNC: 129 IU/L (ref 39–117)
ALT SERPL-CCNC: 22 IU/L (ref 0–44)
AST SERPL-CCNC: 17 IU/L (ref 0–40)
BILIRUB SERPL-MCNC: 0.3 MG/DL (ref 0–1.2)
BUN SERPL-MCNC: 21 MG/DL (ref 8–27)
BUN/CREAT SERPL: 17 (ref 10–24)
CALCIUM SERPL-MCNC: 9.6 MG/DL (ref 8.6–10.2)
CHLORIDE SERPL-SCNC: 100 MMOL/L (ref 96–106)
CHOLEST SERPL-MCNC: 169 MG/DL (ref 100–199)
CO2 SERPL-SCNC: 26 MMOL/L (ref 20–29)
CREAT SERPL-MCNC: 1.26 MG/DL (ref 0.76–1.27)
CREAT UR-MCNC: 117.6 MG/DL
EST. AVERAGE GLUCOSE BLD GHB EST-MCNC: 189 MG/DL
GLOBULIN SER CALC-MCNC: 2.4 G/DL (ref 1.5–4.5)
GLUCOSE SERPL-MCNC: 149 MG/DL (ref 65–99)
HBA1C MFR BLD: 8.2 % (ref 4.8–5.6)
HDLC SERPL-MCNC: 62 MG/DL
INTERPRETATION, 910389: NORMAL
INTERPRETATION: NORMAL
LDLC SERPL CALC-MCNC: 83 MG/DL (ref 0–99)
Lab: NORMAL
MICROALBUMIN UR-MCNC: 238.7 UG/ML
PDF IMAGE, 910387: NORMAL
POTASSIUM SERPL-SCNC: 4.3 MMOL/L (ref 3.5–5.2)
PROT SERPL-MCNC: 7 G/DL (ref 6–8.5)
SODIUM SERPL-SCNC: 139 MMOL/L (ref 134–144)
TRIGL SERPL-MCNC: 136 MG/DL (ref 0–149)
VLDLC SERPL CALC-MCNC: 24 MG/DL (ref 5–40)

## 2020-12-16 ENCOUNTER — VIRTUAL VISIT (OUTPATIENT)
Dept: ENDOCRINOLOGY | Age: 65
End: 2020-12-16
Payer: MEDICARE

## 2020-12-16 DIAGNOSIS — E11.21 TYPE 2 DIABETES MELLITUS WITH DIABETIC NEPHROPATHY, WITHOUT LONG-TERM CURRENT USE OF INSULIN (HCC): Primary | ICD-10-CM

## 2020-12-16 DIAGNOSIS — I10 ESSENTIAL HYPERTENSION: ICD-10-CM

## 2020-12-16 DIAGNOSIS — E78.2 MIXED HYPERLIPIDEMIA: ICD-10-CM

## 2020-12-16 PROCEDURE — 1101F PT FALLS ASSESS-DOCD LE1/YR: CPT | Performed by: INTERNAL MEDICINE

## 2020-12-16 PROCEDURE — 3017F COLORECTAL CA SCREEN DOC REV: CPT | Performed by: INTERNAL MEDICINE

## 2020-12-16 PROCEDURE — 3052F HG A1C>EQUAL 8.0%<EQUAL 9.0%: CPT | Performed by: INTERNAL MEDICINE

## 2020-12-16 PROCEDURE — G8756 NO BP MEASURE DOC: HCPCS | Performed by: INTERNAL MEDICINE

## 2020-12-16 PROCEDURE — 99214 OFFICE O/P EST MOD 30 MIN: CPT | Performed by: INTERNAL MEDICINE

## 2020-12-16 PROCEDURE — G8432 DEP SCR NOT DOC, RNG: HCPCS | Performed by: INTERNAL MEDICINE

## 2020-12-16 PROCEDURE — 2022F DILAT RTA XM EVC RTNOPTHY: CPT | Performed by: INTERNAL MEDICINE

## 2020-12-16 PROCEDURE — G8427 DOCREV CUR MEDS BY ELIG CLIN: HCPCS | Performed by: INTERNAL MEDICINE

## 2020-12-16 NOTE — PROGRESS NOTES
Chief Complaint   Patient presents with    Diabetes     pcp and pharmacy. Eye exam: this year  DOXY. ME            **THIS IS A VIRTUAL VISIT VIA A VIDEO ENCOUNTER. PATIENT AGREED TO HAVE THEIR CARE DELIVERED OVER VIDEO IN PLACE OF THEIR REGULARLY SCHEDULED OFFICE VISIT**      History of Present Illness: Sandra Barnes is a 77 y.o. male here for follow up of diabetes. He was diagnosed with diabetes around 2005. His A1C last week was 8.2%    He is currently taking Metformin XR 1000mg BID, Actos 30mg HS, Glipizide 5mg BID and Trulicity 3.9DJ weekly. He is not checking his BGs regularly. He has been taking his phentermine every morning. He notes that Phentermine is helping during the day, but \"I am still overeating at dinner time  He denies any recent illnesses, injuries or hospitalizations. He had his last steroid injection in his shoulder was in 2019. Pt is eating 3 meals daily  He has breakfast around 9-10AM, today he had a piece of sausage, bread and coffee (creamer). He will occasionally have a snack in the mid-morning. He has lunch around 1PM, today he had 4 pieces of fried fish, fries, pecan pie and water. He has dinner around 5-6PM, last night he had a ham sandwich and apple pie. No history of vascular disease. No history of retinopathy or neuropathy, but does have nephropathy (history of positive microalbuminuria and CKD). Last eye exam was September 2020. He said my eyes look good. He is taking drops for his eye pressure. Pt denies any issues of numbness or burning in his feet. Pt saw Dr. Kings Gama of GI, he had EGD and colonoscopy and gastric emptying stomach. He was found to have slow emptying of his stomach (gastroparesis) and a hiatal hernia. He saw Dr. Kings Gama and Pita Treviño said my stomach was looking better\". His lipid panel in December 2020 was at goal. He has been tolerating his regimen of Simvastatin 20mg daily with no issues.       Current Outpatient Medications   Medication Sig    Trulicity 1.5 TH/9.1 mL sub-q pen INJECT 0.5 ML (1 PEN) UNDER THE SKIN EVERY 7 DAYS    metFORMIN ER (GLUCOPHAGE XR) 500 mg tablet TAKE 2 TABLETS TWICE A DAY FOR DIABETES    pioglitazone (ACTOS) 30 mg tablet TAKE 1 TABLET DAILY FOR DIABETES    glipiZIDE (GLUCOTROL) 5 mg tablet TAKE 1 TABLET TWICE A DAY WITH BREAKFAST AND DINNER    glucose blood VI test strips (OneTouch Verio test strips) strip Check sugars three times per day    phentermine (ADIPEX-P) 37.5 mg tablet TAKE 1/2 TABLET BY MOUTH 30 MINUTES BEFORE DINNER    olmesartan (BENICAR) 20 mg tablet TAKE 1 TABLET DAILY    simvastatin (ZOCOR) 20 mg tablet TAKE 1 TABLET NIGHTLY FOR CHOLESTEROL    dapagliflozin (FARXIGA) 5 mg tab tablet Take 1 Tab by mouth daily. Do not fill till January 2020 (formulary change)    apple cider vinegar 600 mg cap Take 1 Tab by mouth daily.  cyanocobalamin (VITAMIN B-12) 1,000 mcg tablet Take 1,000 mcg by mouth daily.  aspirin delayed-release 81 mg tablet Take  by mouth daily. Takes 2 daily    lansoprazole (PREVACID) 30 mg capsule daily.  ONETOUCH VERIO IQ METER misc USE TO CHECK BLOOD SUGAR THREE TIMES A DAY    mometasone (ELOCON) 0.1 % topical cream as needed.  VIAGRA 100 mg tablet as needed.  multivitamin (ONE A DAY) tablet Take 1 Tab by mouth daily.  OM-3/E/LINOL/ALA/OLEIC/GLA/LIP (OMEGA 3-6-9 PO) Take  by mouth two (2) times a day.  cinnamon bark (CINNAMON) 500 mg cap Take  by mouth two (2) times a day.  tiZANidine (ZANAFLEX) 2 mg tablet TK 1 T PO TID PRF BACK PAIN     No current facility-administered medications for this visit.       Allergies   Allergen Reactions    Diclofenac Itching    Gabapentin Hives    Jardiance [Empagliflozin] Other (comments)     Polyuria    Penicillins Hives     Review of Systems:  - Eyes: no blurry vision or double vision  - Cardiovascular: no chest pain  - Respiratory: no shortness of breath  - Musculoskeletal: no myalgias  - Neurological: no numbness/tingling in extremities    Physical Examination:  There were no vitals taken for this visit. - General: pleasant, no distress, good eye contact   - Neck: no carotid bruits  - Cardiovascular: regular, normal rate, nl s1 and s2, no m/r/g, 2+ DP pulses   - Respiratory: clear bilaterally  - Integumentary: no edema, no foot ulcers, sensation to monofilament and vibration intact bilaterally  - Psychiatric: normal mood and affect    Data Reviewed:   Component      Latest Ref Rng & Units 12/12/2020 12/12/2020 12/12/2020 12/12/2020           7:46 AM  7:46 AM  7:46 AM  7:46 AM   Glucose      65 - 99 mg/dL 149 (H)      BUN      8 - 27 mg/dL 21      Creatinine      0.76 - 1.27 mg/dL 1.26      GFR est non-AA      >59 mL/min/1.73 59 (L)      GFR est AA      >59 mL/min/1.73 68      BUN/Creatinine ratio      10 - 24 17      Sodium      134 - 144 mmol/L 139      Potassium      3.5 - 5.2 mmol/L 4.3      Chloride      96 - 106 mmol/L 100      CO2      20 - 29 mmol/L 26      Calcium      8.6 - 10.2 mg/dL 9.6      Protein, total      6.0 - 8.5 g/dL 7.0      Albumin      3.8 - 4.8 g/dL 4.6      GLOBULIN, TOTAL      1.5 - 4.5 g/dL 2.4      A-G Ratio      1.2 - 2.2 1.9      Bilirubin, total      0.0 - 1.2 mg/dL 0.3      Alk. phosphatase      39 - 117 IU/L 129 (H)      AST      0 - 40 IU/L 17      ALT      0 - 44 IU/L 22      Cholesterol, total      100 - 199 mg/dL   169    Triglyceride      0 - 149 mg/dL   136    HDL Cholesterol      >39 mg/dL   62    VLDL Cholesterol Rolo      5 - 40 mg/dL   24    LDL Cholesterol      0 - 99 mg/dL   83    Creatinine, urine      Not Estab. mg/dL  117.6     Microalbumin, urine      Not Estab. ug/mL  238.7     Microalbumin/Creat. Ratio      0 - 29 mg/g creat  203 (H)     Hemoglobin A1c, (calculated)      4.8 - 5.6 %    8.2 (H)   Estimated average glucose      mg/dL    189       Assessment/Plan:   1) DM > His A1C last week was 8.2%.  We discussed at length the importance of cutting out the sweets, cutting down on cakes, breads, pasta rice and potatoes. He was hesitant to make any changes at this time, but we discussed that if he does not make the dietary changes and get his BGs down by our next visit, we will need to adjust his DM regimen. For not pt to continue the Metformin XR 1000mg BID, Actos 30mg HS, Glipizide 5mg BID and Trulicity 7.7OM weekly and Farxiga 5mg daily. Pt to continue the Phentermine 18.75mg with dinner. Pt to check his BGs twice per day. 2) HTN > He is on an ARB for renal protection. He has developed urine albuminuria. 3) HLD > His lipid panel in December 2020 was at goal. Pt is tolerating his Simvastatin well. RTC 4 months    Pt voices understanding and agreement with the plan. There are no Patient Instructions on file for this visit.       Copy sent to:

## 2021-01-07 RX ORDER — DAPAGLIFLOZIN 5 MG/1
TABLET, FILM COATED ORAL
Qty: 90 TAB | Refills: 3 | Status: SHIPPED | OUTPATIENT
Start: 2021-01-07 | End: 2022-01-19

## 2021-01-27 DIAGNOSIS — E11.21 TYPE 2 DIABETES MELLITUS WITH DIABETIC NEPHROPATHY, WITHOUT LONG-TERM CURRENT USE OF INSULIN (HCC): ICD-10-CM

## 2021-01-28 RX ORDER — PHENTERMINE HYDROCHLORIDE 37.5 MG/1
TABLET ORAL
Qty: 100 TAB | Refills: 0 | Status: SHIPPED | OUTPATIENT
Start: 2021-01-28 | End: 2021-08-06

## 2021-03-16 DIAGNOSIS — E11.21 TYPE 2 DIABETES MELLITUS WITH DIABETIC NEPHROPATHY, WITHOUT LONG-TERM CURRENT USE OF INSULIN (HCC): ICD-10-CM

## 2021-04-03 LAB
EST. AVERAGE GLUCOSE BLD GHB EST-MCNC: 160 MG/DL
HBA1C MFR BLD: 7.2 % (ref 4.8–5.6)

## 2021-04-12 ENCOUNTER — OFFICE VISIT (OUTPATIENT)
Dept: ENDOCRINOLOGY | Age: 66
End: 2021-04-12
Payer: MEDICARE

## 2021-04-12 VITALS
DIASTOLIC BLOOD PRESSURE: 81 MMHG | RESPIRATION RATE: 14 BRPM | HEART RATE: 96 BPM | BODY MASS INDEX: 33.09 KG/M2 | WEIGHT: 205 LBS | TEMPERATURE: 98.3 F | SYSTOLIC BLOOD PRESSURE: 125 MMHG

## 2021-04-12 DIAGNOSIS — E78.2 MIXED HYPERLIPIDEMIA: ICD-10-CM

## 2021-04-12 DIAGNOSIS — E11.21 TYPE 2 DIABETES MELLITUS WITH DIABETIC NEPHROPATHY, WITHOUT LONG-TERM CURRENT USE OF INSULIN (HCC): Primary | ICD-10-CM

## 2021-04-12 DIAGNOSIS — I10 ESSENTIAL HYPERTENSION: ICD-10-CM

## 2021-04-12 PROCEDURE — 3051F HG A1C>EQUAL 7.0%<8.0%: CPT | Performed by: INTERNAL MEDICINE

## 2021-04-12 PROCEDURE — 2022F DILAT RTA XM EVC RTNOPTHY: CPT | Performed by: INTERNAL MEDICINE

## 2021-04-12 PROCEDURE — G8754 DIAS BP LESS 90: HCPCS | Performed by: INTERNAL MEDICINE

## 2021-04-12 PROCEDURE — 3017F COLORECTAL CA SCREEN DOC REV: CPT | Performed by: INTERNAL MEDICINE

## 2021-04-12 PROCEDURE — G8432 DEP SCR NOT DOC, RNG: HCPCS | Performed by: INTERNAL MEDICINE

## 2021-04-12 PROCEDURE — G8417 CALC BMI ABV UP PARAM F/U: HCPCS | Performed by: INTERNAL MEDICINE

## 2021-04-12 PROCEDURE — G8752 SYS BP LESS 140: HCPCS | Performed by: INTERNAL MEDICINE

## 2021-04-12 PROCEDURE — 1101F PT FALLS ASSESS-DOCD LE1/YR: CPT | Performed by: INTERNAL MEDICINE

## 2021-04-12 PROCEDURE — 99214 OFFICE O/P EST MOD 30 MIN: CPT | Performed by: INTERNAL MEDICINE

## 2021-04-12 PROCEDURE — G8427 DOCREV CUR MEDS BY ELIG CLIN: HCPCS | Performed by: INTERNAL MEDICINE

## 2021-04-12 PROCEDURE — G8536 NO DOC ELDER MAL SCRN: HCPCS | Performed by: INTERNAL MEDICINE

## 2021-04-12 NOTE — PROGRESS NOTES
Chief Complaint   Patient presents with    Diabetes     pcp and pharmacy verified. Eye exam: 2020 IN PERSON       History of Present Illness: Any Blake is a 77 y.o. male here for follow up of diabetes. He was diagnosed with diabetes around 2005. His A1C last week had improved from 8.2% down to 7.2%. He denies any illnesses, injuries or hospitalizations. Pt notes he has been having issues of OA in both shoulders. \"They sent me to PT but that did not help. Pt denies any steroids since our last visit. He is currently taking Metformin XR 1000mg BID, Actos 30mg HS, Glipizide 5mg BID, Farxiga 5mg daily and Trulicity 3.5EC weekly. He is not checking his BGs regularly. He has been taking his phentermine every morning. He notes that Phentermine is helping during the day, but \"I am still overeating at dinner time    Pt is eating 3 meals daily  He has breakfast around 9-10AM, today he had a breakfast bar and coffee (creamer). He will occasionally have a snack in the mid-morning. He has lunch around 1PM, today he had PB&J and ham sandwich and water. He has dinner around 5-6PM, last night he had pork chops, no side items. No history of vascular disease. No history of retinopathy or neuropathy, but does have nephropathy (history of positive microalbuminuria and CKD). Last eye exam was September 2020. He said my eyes look good. He is taking drops for his eye pressure. Pt denies any issues of numbness or burning in his feet. Pt saw Dr. Macedo Began of GI, he had EGD and colonoscopy and gastric emptying stomach. He was found to have slow emptying of his stomach (gastroparesis) and a hiatal hernia. He saw Dr. Macedo Began and Kathie Walters said my stomach was looking better\". His lipid panel in December 2020 was at goal. He has been tolerating his regimen of Simvastatin 20mg daily with no issues.       Current Outpatient Medications   Medication Sig    phentermine (ADIPEX-P) 37.5 mg tablet TAKE 1/2 TABLET BY MOUTH 30 MINUTES BEFORE DINNER    Farxiga 5 mg tab tablet TAKE 1 TABLET DAILY    Trulicity 1.5 OJ/0.7 mL sub-q pen INJECT 0.5 ML (1 PEN) UNDER THE SKIN EVERY 7 DAYS    metFORMIN ER (GLUCOPHAGE XR) 500 mg tablet TAKE 2 TABLETS TWICE A DAY FOR DIABETES    pioglitazone (ACTOS) 30 mg tablet TAKE 1 TABLET DAILY FOR DIABETES    glipiZIDE (GLUCOTROL) 5 mg tablet TAKE 1 TABLET TWICE A DAY WITH BREAKFAST AND DINNER    glucose blood VI test strips (OneTouch Verio test strips) strip Check sugars three times per day    olmesartan (BENICAR) 20 mg tablet TAKE 1 TABLET DAILY    simvastatin (ZOCOR) 20 mg tablet TAKE 1 TABLET NIGHTLY FOR CHOLESTEROL    apple cider vinegar 600 mg cap Take 1 Tab by mouth daily.  cyanocobalamin (VITAMIN B-12) 1,000 mcg tablet Take 1,000 mcg by mouth daily.  aspirin delayed-release 81 mg tablet Take  by mouth daily. Takes 2 daily    lansoprazole (PREVACID) 30 mg capsule daily.  ONETOUCH VERIO IQ METER misc USE TO CHECK BLOOD SUGAR THREE TIMES A DAY    mometasone (ELOCON) 0.1 % topical cream as needed.  VIAGRA 100 mg tablet as needed.  multivitamin (ONE A DAY) tablet Take 1 Tab by mouth daily.  OM-3/E/LINOL/ALA/OLEIC/GLA/LIP (OMEGA 3-6-9 PO) Take  by mouth two (2) times a day.  cinnamon bark (CINNAMON) 500 mg cap Take  by mouth two (2) times a day. No current facility-administered medications for this visit. Allergies   Allergen Reactions    Diclofenac Itching    Gabapentin Hives    Jardiance [Empagliflozin] Other (comments)     Polyuria    Penicillins Hives     Review of Systems:  - Eyes: no blurry vision or double vision  - Cardiovascular: no chest pain  - Respiratory: no shortness of breath  - Musculoskeletal: no myalgias, chronic shoulder pain. - Neurological: no numbness/tingling in extremities    Physical Examination:  There were no vitals taken for this visit.   - General: pleasant, no distress, good eye contact   - Neck: no carotid bruits  - Cardiovascular: regular, normal rate, nl s1 and s2, no m/r/g, 2+ DP pulses   - Respiratory: clear bilaterally  - Integumentary: no edema, no foot ulcers,  - Psychiatric: normal mood and affect    Diabetic foot exam:     Left Foot:   Visual Exam: normal    Pulse DP: 2+ (normal)   Filament test: normal sensation    Vibratory sensation: normal      Right Foot:   Visual Exam: normal    Pulse DP: 2+ (normal)   Filament test: normal sensation    Vibratory sensation: normal    Data Reviewed:   Component      Latest Ref Rng & Units 4/2/2021           8:25 AM   Hemoglobin A1c, (calculated)      4.8 - 5.6 % 7.2 (H)   Estimated average glucose      mg/dL 160       Assessment/Plan:   1) DM > His A1C last week improved from 8.2% down to 7.2%. Pt encouraged to keep up the good work with the dietary improvements. Pt to continue the Metformin XR 1000mg BID, Actos 30mg HS, Glipizide 5mg BID and Trulicity 5.0MT weekly and Farxiga 5mg daily. Pt to continue the Phentermine 18.75mg with dinner. Pt to check his BGs twice per day. 2) HTN > He is on an ARB for renal protection. He has developed urine albuminuria. 3) HLD > His lipid panel in December 2020 was at goal. Pt is tolerating his Simvastatin well. RTC 4 months    Pt voices understanding and agreement with the plan.     Copy sent to:  Dr. Mamie Olvera

## 2021-04-15 DIAGNOSIS — E78.2 MIXED HYPERLIPIDEMIA: ICD-10-CM

## 2021-04-16 RX ORDER — SIMVASTATIN 20 MG/1
TABLET, FILM COATED ORAL
Qty: 90 TAB | Refills: 3 | Status: SHIPPED | OUTPATIENT
Start: 2021-04-16 | End: 2022-03-29

## 2021-07-29 RX ORDER — OLMESARTAN MEDOXOMIL 20 MG/1
TABLET ORAL
Qty: 90 TABLET | Refills: 3 | Status: SHIPPED | OUTPATIENT
Start: 2021-07-29

## 2021-08-02 DIAGNOSIS — E11.21 TYPE 2 DIABETES MELLITUS WITH DIABETIC NEPHROPATHY, WITHOUT LONG-TERM CURRENT USE OF INSULIN (HCC): ICD-10-CM

## 2021-08-17 ENCOUNTER — PATIENT MESSAGE (OUTPATIENT)
Dept: ENDOCRINOLOGY | Age: 66
End: 2021-08-17

## 2021-08-23 ENCOUNTER — TELEPHONE (OUTPATIENT)
Dept: ENDOCRINOLOGY | Age: 66
End: 2021-08-23

## 2021-08-23 NOTE — TELEPHONE ENCOUNTER
----- Message from Rylee Noel sent at 8/23/2021 11:51 AM EDT -----  Regarding: Bharat Albert - MD/ telephone  Caller's first and last name: Marilee/Pepe Scott for call: correction on orderCallback required yes/no and why:Best contact number(s): [phone] to clarify the request: needs to be a 3 month order

## 2021-08-23 NOTE — TELEPHONE ENCOUNTER
Per [facility]rep, the length of need needs to be either 12 mos or 99 months. Form has been corrected to 99 months and faxed back with confirmation received.

## 2021-10-14 ENCOUNTER — TRANSCRIBE ORDER (OUTPATIENT)
Dept: SCHEDULING | Age: 66
End: 2021-10-14

## 2021-10-14 DIAGNOSIS — M51.26 HERNIATED LUMBAR INTERVERTEBRAL DISC: Primary | ICD-10-CM

## 2021-11-01 ENCOUNTER — HOSPITAL ENCOUNTER (OUTPATIENT)
Dept: GENERAL RADIOLOGY | Age: 66
Discharge: HOME | End: 2021-11-01
Payer: MEDICARE

## 2021-11-01 ENCOUNTER — TRANSCRIBE ORDER (OUTPATIENT)
Dept: REGISTRATION | Age: 66
End: 2021-11-01

## 2021-11-01 DIAGNOSIS — M51.26 DISPLACEMENT OF LUMBAR INTERVERTEBRAL DISC WITHOUT MYELOPATHY: ICD-10-CM

## 2021-11-01 DIAGNOSIS — M51.26 DISPLACEMENT OF LUMBAR INTERVERTEBRAL DISC WITHOUT MYELOPATHY: Primary | ICD-10-CM

## 2021-11-01 PROCEDURE — 72100 X-RAY EXAM L-S SPINE 2/3 VWS: CPT

## 2021-11-15 ENCOUNTER — TRANSCRIBE ORDER (OUTPATIENT)
Dept: SCHEDULING | Age: 66
End: 2021-11-15

## 2021-11-15 DIAGNOSIS — M54.50 LUMBAR PAIN: Primary | ICD-10-CM

## 2021-11-15 DIAGNOSIS — M47.26 OSTEOARTHRITIS OF SPINE WITH RADICULOPATHY, LUMBAR REGION: ICD-10-CM

## 2021-11-15 DIAGNOSIS — M54.32 LEFT SIDED SCIATICA: ICD-10-CM

## 2021-11-22 RX ORDER — DULAGLUTIDE 1.5 MG/.5ML
INJECTION, SOLUTION SUBCUTANEOUS
Qty: 6 ML | Refills: 3 | Status: SHIPPED | OUTPATIENT
Start: 2021-11-22 | End: 2022-04-25 | Stop reason: DRUGHIGH

## 2021-11-24 ENCOUNTER — HOSPITAL ENCOUNTER (OUTPATIENT)
Dept: MRI IMAGING | Age: 66
Discharge: HOME | End: 2021-11-24
Attending: ORTHOPAEDIC SURGERY
Payer: MEDICARE

## 2021-11-24 DIAGNOSIS — M54.32 LEFT SIDED SCIATICA: ICD-10-CM

## 2021-11-24 DIAGNOSIS — M47.26 OSTEOARTHRITIS OF SPINE WITH RADICULOPATHY, LUMBAR REGION: ICD-10-CM

## 2021-11-24 DIAGNOSIS — M54.50 LUMBAR PAIN: ICD-10-CM

## 2021-11-24 PROCEDURE — 72148 MRI LUMBAR SPINE W/O DYE: CPT

## 2021-12-06 ENCOUNTER — OFFICE VISIT (OUTPATIENT)
Dept: ENDOCRINOLOGY | Age: 66
End: 2021-12-06
Payer: MEDICARE

## 2021-12-06 VITALS
HEIGHT: 66 IN | WEIGHT: 204 LBS | SYSTOLIC BLOOD PRESSURE: 107 MMHG | BODY MASS INDEX: 32.78 KG/M2 | HEART RATE: 96 BPM | DIASTOLIC BLOOD PRESSURE: 70 MMHG

## 2021-12-06 DIAGNOSIS — I10 ESSENTIAL HYPERTENSION: ICD-10-CM

## 2021-12-06 DIAGNOSIS — E78.2 MIXED HYPERLIPIDEMIA: ICD-10-CM

## 2021-12-06 DIAGNOSIS — E11.21 TYPE 2 DIABETES MELLITUS WITH DIABETIC NEPHROPATHY, WITHOUT LONG-TERM CURRENT USE OF INSULIN (HCC): Primary | ICD-10-CM

## 2021-12-06 LAB — HBA1C MFR BLD HPLC: 7.8 %

## 2021-12-06 PROCEDURE — G8754 DIAS BP LESS 90: HCPCS | Performed by: INTERNAL MEDICINE

## 2021-12-06 PROCEDURE — 83036 HEMOGLOBIN GLYCOSYLATED A1C: CPT | Performed by: INTERNAL MEDICINE

## 2021-12-06 PROCEDURE — 3051F HG A1C>EQUAL 7.0%<8.0%: CPT | Performed by: INTERNAL MEDICINE

## 2021-12-06 PROCEDURE — 3017F COLORECTAL CA SCREEN DOC REV: CPT | Performed by: INTERNAL MEDICINE

## 2021-12-06 PROCEDURE — 2022F DILAT RTA XM EVC RTNOPTHY: CPT | Performed by: INTERNAL MEDICINE

## 2021-12-06 PROCEDURE — G8427 DOCREV CUR MEDS BY ELIG CLIN: HCPCS | Performed by: INTERNAL MEDICINE

## 2021-12-06 PROCEDURE — G8752 SYS BP LESS 140: HCPCS | Performed by: INTERNAL MEDICINE

## 2021-12-06 PROCEDURE — G8417 CALC BMI ABV UP PARAM F/U: HCPCS | Performed by: INTERNAL MEDICINE

## 2021-12-06 PROCEDURE — G8432 DEP SCR NOT DOC, RNG: HCPCS | Performed by: INTERNAL MEDICINE

## 2021-12-06 PROCEDURE — 99214 OFFICE O/P EST MOD 30 MIN: CPT | Performed by: INTERNAL MEDICINE

## 2021-12-06 PROCEDURE — G8536 NO DOC ELDER MAL SCRN: HCPCS | Performed by: INTERNAL MEDICINE

## 2021-12-06 PROCEDURE — 1101F PT FALLS ASSESS-DOCD LE1/YR: CPT | Performed by: INTERNAL MEDICINE

## 2021-12-06 RX ORDER — GLIPIZIDE 10 MG/1
10 TABLET ORAL 2 TIMES DAILY
Qty: 180 TABLET | Refills: 3 | Status: SHIPPED | OUTPATIENT
Start: 2021-12-06

## 2021-12-06 RX ORDER — PREGABALIN 75 MG/1
75 CAPSULE ORAL DAILY
COMMUNITY
Start: 2021-10-22

## 2021-12-06 RX ORDER — PREDNISONE 20 MG/1
20 TABLET ORAL 2 TIMES DAILY
COMMUNITY
Start: 2021-10-22 | End: 2022-04-25

## 2021-12-06 NOTE — LETTER
12/6/2021    Patient: Bernadine Bowen   YOB: 1955   Date of Visit: 12/6/2021     Lissa Forbes MD  [facility] [place] [address].  [address] [place]    Dear Lissa Forbes MD,      Thank you for referring Mr. Yamileth Arias to [address] [place] [address] for evaluation. My notes for this consultation are attached. If you have questions, please do not hesitate to call me. I look forward to following your patient along with you.       Sincerely,    Joaquina Campoverde MD

## 2021-12-06 NOTE — PROGRESS NOTES
Chief Complaint   Patient presents with    Diabetes       History of Present Illness: Elvin Hayes is a 74 y.o. male here for follow up of diabetes. He was diagnosed with diabetes around 2005. At our last visit in April 2021 his A1C was down to 7.2% on  Metformin XR 1000mg BID, Actos 30mg HS, Glipizide 5mg BID, Farxiga 5mg daily and Trulicity 6.8CF weekly. Pt encouraged to keep up the good work with the dietary improvements  His weight was 205 pounds in April 2021. \"My left leg started hurting about 3 months ago and then it started running down my left leg. The pain prevented me from sleeping. I went to see a doctor and they were giving me Gabapentin and a pack of steroids. When I got off the steroids it started hurting again and they gave me another round of steroids. Dr. Anitra Campbell gave me more steroids and sent me for an MRI. I got the MRI and it showed a herniated disc and I was sent for PT. \"  Pt notes he is no longer on prednisone, but is taking Lyrica 75mg BID. He does not recall what the other pill he is taking for his back is called. He is currently taking Metformin XR 1000mg BID, Actos 30mg HS, Glipizide 5mg BID, Farxiga 5mg daily and Trulicity 1.4AL weekly. His A1C today was 7.8%. His weight today was 204 pounds    He is not checking his BGs regularly. He has been taking his phentermine 1/2 pill 30 minutes before dinner. He notes that Phentermine is helping during the day, but \"I am still overeating at dinner time\" so I instructed him to move the phentermine to 1/2 an hour before dinner. Pt is eating 3 meals daily  He has breakfast around 9-10AM, today he had a muffin and coffee (creamer). He will occasionally have a snack in the mid-morning. He has lunch around 1PM, today he had a ham sandwich, chips and water. He has dinner around 5-6PM, last night he had chicken tacos, no side items and water. No history of vascular disease.      No history of retinopathy or neuropathy, but does have nephropathy (history of positive microalbuminuria and CKD). Last eye exam was May 2021. \"He said my eyes look good. \"  He is taking drops for his eye pressure. Pt denies any issues of numbness or burning in his feet. Pt saw Dr. Stephon Mckeon of GI, he had EGD and colonoscopy and gastric emptying stomach. He was found to have slow emptying of his stomach (gastroparesis) and a hiatal hernia. He saw Dr. Stephon Mckeon and Jed Barrett said my stomach was looking better\". His lipid panel in September 2021 was at goal. He has been tolerating his regimen of Simvastatin 20mg daily with no issues. Current Outpatient Medications   Medication Sig    predniSONE (DELTASONE) 20 mg tablet Take 20 mg by mouth two (2) times a day.  pregabalin (LYRICA) 75 mg capsule Take 75 mg by mouth two (2) times a day.  glipiZIDE (GLUCOTROL) 10 mg tablet Take 1 Tablet by mouth two (2) times a day.  Trulicity 1.5 EY/4.8 mL sub-q pen INJECT 0.5 ML (1 PEN) UNDER THE SKIN EVERY 7 DAYS    pioglitazone (ACTOS) 30 mg tablet TAKE 1 TABLET DAILY FOR DIABETES    metFORMIN ER (GLUCOPHAGE XR) 500 mg tablet TAKE 2 TABLETS TWICE A DAY FOR DIABETES    phentermine (ADIPEX-P) 37.5 mg tablet TAKE 1/2 TABLET BY MOUTH 30MIN BEFORE DINNER    olmesartan (BENICAR) 20 mg tablet TAKE 1 TABLET DAILY    simvastatin (ZOCOR) 20 mg tablet TAKE 1 TABLET NIGHTLY FOR CHOLESTEROL    Farxiga 5 mg tab tablet TAKE 1 TABLET DAILY    apple cider vinegar 600 mg cap Take 1 Tab by mouth daily.  cyanocobalamin (VITAMIN B-12) 1,000 mcg tablet Take 1,000 mcg by mouth daily.  aspirin delayed-release 81 mg tablet Take  by mouth daily. Takes 2 daily    lansoprazole (PREVACID) 30 mg capsule daily.  VIAGRA 100 mg tablet as needed.  multivitamin (ONE A DAY) tablet Take 1 Tab by mouth daily.  OM-3/E/LINOL/ALA/OLEIC/GLA/LIP (OMEGA 3-6-9 PO) Take  by mouth two (2) times a day.     cinnamon bark (CINNAMON) 500 mg cap Take  by mouth two (2) times a day.  glucose blood VI test strips (OneTouch Verio test strips) strip Check sugars three times per day    ONETOUCH VERIO IQ METER misc USE TO CHECK BLOOD SUGAR THREE TIMES A DAY    mometasone (ELOCON) 0.1 % topical cream as needed. No current facility-administered medications for this visit. Allergies   Allergen Reactions    Diclofenac Itching    Gabapentin Hives    Jardiance [Empagliflozin] Other (comments)     Polyuria    Penicillins Hives     Review of Systems:  - Eyes: no blurry vision or double vision  - Cardiovascular: no chest pain  - Respiratory: no shortness of breath  - Musculoskeletal: no myalgias, chronic shoulder pain. - Neurological: no numbness/tingling in extremities    Physical Examination:  Blood pressure 107/70, pulse 96, height 5' 6\" (1.676 m), weight 204 lb (92.5 kg). - General: pleasant, no distress, good eye contact   - Neck: no carotid bruits  - Cardiovascular: regular, normal rate, nl s1 and s2, no m/r/g, 2+ DP pulses   - Respiratory: clear bilaterally  - Integumentary: no edema, no foot ulcers,  - Psychiatric: normal mood and affect    Diabetic foot exam:     Left Foot:   Visual Exam: callous - present   Pulse DP: 2+ (normal)   Filament test: normal sensation    Vibratory sensation: normal      Right Foot:   Visual Exam: callous - present   Pulse DP: 2+ (normal)   Filament test: normal sensation    Vibratory sensation: normal      Data Reviewed:   His A1C today was up to 7.8%. Assessment/Plan:   1) DM > His A1C today was up to 7.8%. He has been on steroids since our last visit, but his A1C is still too high. Will increase his Glipizide to 10mg BID and have pt continue the Metformin XR 1000mg BID, Actos 03OZ HS,  Trulicity 5.9XW weekly and Farxiga 5mg daily. Pt to continue the Phentermine 18.75mg with dinner. Pt to check his BGs twice per day. 2) HTN > His BP today was 107/70. He is on an ARB for renal protection. He has developed urine albuminuria.     3) HLD > His lipid panel in September 2021 was at goal. Pt is tolerating his Simvastatin well. RTC 4 months    Pt voices understanding and agreement with the plan. Pain noted and pt was recommended to call his PCP for further evaluation and treatment, as needed      Follow-up and Dispositions    · Return in about 4 months (around 4/6/2022).      Copy sent to:  Dr. Harley Huitron

## 2021-12-13 ENCOUNTER — TELEPHONE (OUTPATIENT)
Dept: ENDOCRINOLOGY | Age: 66
End: 2021-12-13

## 2021-12-13 NOTE — TELEPHONE ENCOUNTER
Per 12/6/21: Will increase his Glipizide to 10mg BI, not Actos. Spoke with wife. She states that she thinks patient got confused. They did receive the new Rx for Glipizide for BID dosing. She will tell  to take Actos once daily and Glipizde for BID.

## 2021-12-13 NOTE — TELEPHONE ENCOUNTER
Pt's wife called in to request a new script be sent to [facility] for the change in the intake of Pioglitazone (ACTOS) 30mg tablets. She stated Pt used to take one tab daily and now has been directed to take two daily by physician.   Thank you

## 2022-03-02 DIAGNOSIS — E11.21 TYPE 2 DIABETES MELLITUS WITH DIABETIC NEPHROPATHY, WITHOUT LONG-TERM CURRENT USE OF INSULIN (HCC): ICD-10-CM

## 2022-03-02 RX ORDER — PHENTERMINE HYDROCHLORIDE 37.5 MG/1
TABLET ORAL
Qty: 100 TABLET | Refills: 0 | Status: SHIPPED | OUTPATIENT
Start: 2022-03-02 | End: 2022-09-23

## 2022-03-18 PROBLEM — E78.2 MIXED HYPERLIPIDEMIA: Status: ACTIVE | Noted: 2018-06-27

## 2022-03-18 PROBLEM — E11.21 TYPE 2 DIABETES MELLITUS WITH DIABETIC NEPHROPATHY, WITHOUT LONG-TERM CURRENT USE OF INSULIN (HCC): Status: ACTIVE | Noted: 2018-06-27

## 2022-03-29 DIAGNOSIS — E78.2 MIXED HYPERLIPIDEMIA: ICD-10-CM

## 2022-03-29 RX ORDER — SIMVASTATIN 20 MG/1
TABLET, FILM COATED ORAL
Qty: 90 TABLET | Refills: 0 | Status: SHIPPED | OUTPATIENT
Start: 2022-03-29 | End: 2022-08-29

## 2022-04-25 ENCOUNTER — OFFICE VISIT (OUTPATIENT)
Dept: ENDOCRINOLOGY | Age: 67
End: 2022-04-25
Payer: COMMERCIAL

## 2022-04-25 VITALS
BODY MASS INDEX: 33.56 KG/M2 | HEART RATE: 98 BPM | SYSTOLIC BLOOD PRESSURE: 111 MMHG | HEIGHT: 66 IN | DIASTOLIC BLOOD PRESSURE: 63 MMHG | WEIGHT: 208.8 LBS

## 2022-04-25 DIAGNOSIS — E53.8 B12 DEFICIENCY: ICD-10-CM

## 2022-04-25 DIAGNOSIS — E55.9 VITAMIN D DEFICIENCY: ICD-10-CM

## 2022-04-25 DIAGNOSIS — E11.21 TYPE 2 DIABETES MELLITUS WITH DIABETIC NEPHROPATHY, WITHOUT LONG-TERM CURRENT USE OF INSULIN (HCC): Primary | ICD-10-CM

## 2022-04-25 DIAGNOSIS — I10 ESSENTIAL HYPERTENSION: ICD-10-CM

## 2022-04-25 DIAGNOSIS — E78.2 MIXED HYPERLIPIDEMIA: ICD-10-CM

## 2022-04-25 PROCEDURE — 99214 OFFICE O/P EST MOD 30 MIN: CPT | Performed by: INTERNAL MEDICINE

## 2022-04-25 RX ORDER — DULAGLUTIDE 3 MG/.5ML
3 INJECTION, SOLUTION SUBCUTANEOUS
Qty: 12 EACH | Refills: 3 | Status: SHIPPED | OUTPATIENT
Start: 2022-04-25

## 2022-04-25 NOTE — PROGRESS NOTES
Chief Complaint   Patient presents with    Diabetes     pcp and pharmacy verified       History of Present Illness: Apolinar Piper is a 74 y.o. male here for follow up of diabetes. He was diagnosed with diabetes around 2005. At our visit in April 2021 his A1C was down to 7.2% on  Metformin XR 1000mg BID, Actos 30mg HS, Glipizide 5mg BID, Farxiga 5mg daily and Trulicity 1.6DB weekly. Pt encouraged to keep up the good work with the dietary improvements  His weight was 205 pounds in April 2021. At our last visit in December 2021 his A1C was up to 7.8%. He had been on steroids since our last visit, but his A1C was still too high. I instructed him to increase his Glipizide to 10mg BID, continue the Metformin XR 1000mg BID, Actos 67HI HS and Trulicity 2.3VL weekly and Farxiga 5mg daily. Pt to continue the Phentermine 18.75mg with dinner. In March 2022 his PCP ramy labs and his A1C was down to 7.4%. His Urine MA/Cr was 120, which has improved from 170 in September 2021. His LDL was 81. His weight today was 208 pounds. Pt denies any recent illnesses, injuries or hospitalizations. He notes he had been on prednisone but is now off. He is now taking Lyrica 75mg BID. He notes that has been helping with his leg pain. He is currently taking Metformin XR 1000mg BID, Actos 30mg HS, Glipizide 10mg BID, Farxiga 5mg daily and Trulicity 5.1PV weekly. He is not checking his BGs regularly. He has been taking his phentermine 1/2 pill 30 minutes before dinner. He notes that Phentermine is helping during the day, but \"I am still overeating at dinner time\" so I instructed him to move the phentermine to 1/2 an hour before dinner. \"When I take the Phentermine I do not want to eat breakfast.    Pt is eating 2-3 meals daily  He has breakfast around 9AM, yesterday he had a ham and egg sandwich, spoon bread and coffee (creamer). He will occasionally have a snack in the mid-morning.   He has lunch around 12-1PM, today he had a ham and turkey sandwich, trail mix and water. He has dinner around 5-6PM, last night he had pork and beans, two pieces of fish, spoon bread and water. No history of vascular disease. No history of retinopathy or neuropathy, but does have nephropathy (history of positive microalbuminuria and CKD). Last eye exam was May 2021. \"He said my eyes look good. \"  He is taking drops for his eye pressure. Pt denies any issues of numbness or burning in his feet. Pt saw Dr. Neftali Coleman of GI, he had EGD and colonoscopy and gastric emptying stomach. He was found to have slow emptying of his stomach (gastroparesis) and a hiatal hernia. He saw Dr. Neftali Coleman and Jennifer Posey said my stomach was looking better\". Current Outpatient Medications   Medication Sig    simvastatin (ZOCOR) 20 mg tablet TAKE 1 TABLET NIGHTLY FOR CHOLESTEROL    phentermine (ADIPEX-P) 37.5 mg tablet TAKE 1/2 TABLET BY MOUTH 30MIN BEFORE DINNER    Farxiga 5 mg tab tablet TAKE 1 TABLET DAILY    pregabalin (LYRICA) 75 mg capsule Take 75 mg by mouth two (2) times a day.  glipiZIDE (GLUCOTROL) 10 mg tablet Take 1 Tablet by mouth two (2) times a day.  Trulicity 1.5 QA/4.5 mL sub-q pen INJECT 0.5 ML (1 PEN) UNDER THE SKIN EVERY 7 DAYS    pioglitazone (ACTOS) 30 mg tablet TAKE 1 TABLET DAILY FOR DIABETES    metFORMIN ER (GLUCOPHAGE XR) 500 mg tablet TAKE 2 TABLETS TWICE A DAY FOR DIABETES    olmesartan (BENICAR) 20 mg tablet TAKE 1 TABLET DAILY (Patient taking differently: Take 10 mg by mouth daily.)    glucose blood VI test strips (OneTouch Verio test strips) strip Check sugars three times per day    apple cider vinegar 600 mg cap Take 1 Tab by mouth daily.  cyanocobalamin (VITAMIN B-12) 1,000 mcg tablet Take 1,000 mcg by mouth daily.  aspirin delayed-release 81 mg tablet Take  by mouth daily. Takes 2 daily    lansoprazole (PREVACID) 30 mg capsule daily.     ONETOUCH VERIO IQ METER misc USE TO CHECK BLOOD SUGAR THREE TIMES A DAY    mometasone (ELOCON) 0.1 % topical cream as needed.  VIAGRA 100 mg tablet as needed.  multivitamin (ONE A DAY) tablet Take 1 Tab by mouth daily.  OM-3/E/LINOL/ALA/OLEIC/GLA/LIP (OMEGA 3-6-9 PO) Take  by mouth two (2) times a day.  cinnamon bark (CINNAMON) 500 mg cap Take  by mouth two (2) times a day. No current facility-administered medications for this visit. Allergies   Allergen Reactions    Diclofenac Itching    Gabapentin Hives    Jardiance [Empagliflozin] Other (comments)     Polyuria    Penicillins Hives     Review of Systems:  - Eyes: no blurry vision or double vision  - Cardiovascular: no chest pain  - Respiratory: no shortness of breath  - Musculoskeletal: no myalgias, chronic shoulder pain. - Neurological: no numbness/tingling in extremities    Physical Examination:  Blood pressure 111/63, pulse 98, height 5' 6\" (1.676 m), weight 208 lb 12.8 oz (94.7 kg). - General: pleasant, no distress, good eye contact   - Neck: no carotid bruits  - Cardiovascular: regular, normal rate, nl s1 and s2, no m/r/g, 2+ DP pulses   - Respiratory: clear bilaterally  - Integumentary: no edema, no foot ulcers,  - Psychiatric: normal mood and affect    Diabetic foot exam:     Left Foot:   Visual Exam: callous - present   Pulse DP: 2+ (normal)   Filament test: normal sensation    Vibratory sensation: normal      Right Foot:   Visual Exam: callous - present   Pulse DP: 2+ (normal)   Filament test: normal sensation    Vibratory sensation: normal      Data Reviewed: In March 2022 his PCP ramy labs and his A1C was down to 7.4%. His Urine MA/Cr was 120, which has improved from 170 in September 2021. His LDL was 81. Assessment/Plan:   1) DM > His A1C has improved to 7.4%. Will increase the Trulicity to 2.9PM weekly. Pt to continue the Glipizide 10mg BID, Metformin XR 1000mg BID, Actos 30mg HS and Farxiga 5mg daily. Pt to continue the Phentermine 18.75mg with dinner.    Pt to check his BGs twice per day. 2) HTN > His BP today was 111/63. He is on an ARB for renal protection. He has developed urine albuminuria, but the level has improved in the last 6 months from 170 down to 120.    3) HLD > His lipid panel in March 2022 was at goal. Pt is tolerating his Simvastatin well. RTC 4 months    Pt voices understanding and agreement with the plan.   Pain noted and pt was recommended to call his PCP for further evaluation and treatment, as needed      Copy sent to:  Dr. Brent Hendrix

## 2022-04-25 NOTE — LETTER
4/25/2022    Patient: Carol Seth   YOB: 1955   Date of Visit: 4/25/2022     Giovanni Murphy MD  [facility] [place] [address].  Via [address] [facility] [address]    Dear Giovanni Murphy MD,      Thank you for referring Mr. Beryl Barros to [address] [place] [address] for evaluation. My notes for this consultation are attached. If you have questions, please do not hesitate to call me. I look forward to following your patient along with you.       Sincerely,    Mortimer Gails, MD

## 2022-08-01 DIAGNOSIS — E53.8 B12 DEFICIENCY: ICD-10-CM

## 2022-08-01 DIAGNOSIS — E11.21 TYPE 2 DIABETES MELLITUS WITH DIABETIC NEPHROPATHY, WITHOUT LONG-TERM CURRENT USE OF INSULIN (HCC): ICD-10-CM

## 2022-08-01 DIAGNOSIS — E55.9 VITAMIN D DEFICIENCY: ICD-10-CM

## 2022-08-01 DIAGNOSIS — I10 ESSENTIAL HYPERTENSION: ICD-10-CM

## 2022-08-26 DIAGNOSIS — E78.2 MIXED HYPERLIPIDEMIA: ICD-10-CM

## 2022-08-29 LAB
25(OH)D3+25(OH)D2 SERPL-MCNC: 27.5 NG/ML (ref 30–100)
ALBUMIN SERPL-MCNC: 4.4 G/DL (ref 3.8–4.8)
ALBUMIN/CREAT UR: 76 MG/G CREAT (ref 0–29)
ALBUMIN/GLOB SERPL: 1.8 {RATIO} (ref 1.2–2.2)
ALP SERPL-CCNC: 111 IU/L (ref 44–121)
ALT SERPL-CCNC: 17 IU/L (ref 0–44)
AST SERPL-CCNC: 21 IU/L (ref 0–40)
BILIRUB SERPL-MCNC: 0.3 MG/DL (ref 0–1.2)
BUN SERPL-MCNC: 14 MG/DL (ref 8–27)
BUN/CREAT SERPL: 11 (ref 10–24)
CALCIUM SERPL-MCNC: 9.2 MG/DL (ref 8.6–10.2)
CHLORIDE SERPL-SCNC: 102 MMOL/L (ref 96–106)
CO2 SERPL-SCNC: 21 MMOL/L (ref 20–29)
CREAT SERPL-MCNC: 1.24 MG/DL (ref 0.76–1.27)
CREAT UR-MCNC: 152.3 MG/DL
EGFR: 63 ML/MIN/1.73
ERYTHROCYTE [DISTWIDTH] IN BLOOD BY AUTOMATED COUNT: 14.6 % (ref 11.6–15.4)
EST. AVERAGE GLUCOSE BLD GHB EST-MCNC: 151 MG/DL
GLOBULIN SER CALC-MCNC: 2.5 G/DL (ref 1.5–4.5)
GLUCOSE SERPL-MCNC: 94 MG/DL (ref 65–99)
HBA1C MFR BLD: 6.9 % (ref 4.8–5.6)
HCT VFR BLD AUTO: 43.3 % (ref 37.5–51)
HGB BLD-MCNC: 14.2 G/DL (ref 13–17.7)
MCH RBC QN AUTO: 28.7 PG (ref 26.6–33)
MCHC RBC AUTO-ENTMCNC: 32.8 G/DL (ref 31.5–35.7)
MCV RBC AUTO: 88 FL (ref 79–97)
MICROALBUMIN UR-MCNC: 115.2 UG/ML
PLATELET # BLD AUTO: 204 X10E3/UL (ref 150–450)
POTASSIUM SERPL-SCNC: 4.6 MMOL/L (ref 3.5–5.2)
PROT SERPL-MCNC: 6.9 G/DL (ref 6–8.5)
RBC # BLD AUTO: 4.94 X10E6/UL (ref 4.14–5.8)
SODIUM SERPL-SCNC: 144 MMOL/L (ref 134–144)
VIT B12 SERPL-MCNC: 576 PG/ML (ref 232–1245)
WBC # BLD AUTO: 4.1 X10E3/UL (ref 3.4–10.8)

## 2022-08-29 RX ORDER — SIMVASTATIN 20 MG/1
TABLET, FILM COATED ORAL
Qty: 90 TABLET | Refills: 3 | Status: SHIPPED | OUTPATIENT
Start: 2022-08-29

## 2022-09-07 ENCOUNTER — OFFICE VISIT (OUTPATIENT)
Dept: ENDOCRINOLOGY | Age: 67
End: 2022-09-07
Payer: COMMERCIAL

## 2022-09-07 VITALS
DIASTOLIC BLOOD PRESSURE: 63 MMHG | SYSTOLIC BLOOD PRESSURE: 98 MMHG | BODY MASS INDEX: 31.95 KG/M2 | HEIGHT: 66 IN | HEART RATE: 92 BPM | WEIGHT: 198.8 LBS

## 2022-09-07 DIAGNOSIS — E53.8 B12 DEFICIENCY: ICD-10-CM

## 2022-09-07 DIAGNOSIS — E11.21 TYPE 2 DIABETES MELLITUS WITH DIABETIC NEPHROPATHY, WITHOUT LONG-TERM CURRENT USE OF INSULIN (HCC): Primary | ICD-10-CM

## 2022-09-07 DIAGNOSIS — E55.9 VITAMIN D DEFICIENCY: ICD-10-CM

## 2022-09-07 DIAGNOSIS — I10 ESSENTIAL HYPERTENSION: ICD-10-CM

## 2022-09-07 DIAGNOSIS — E78.2 MIXED HYPERLIPIDEMIA: ICD-10-CM

## 2022-09-07 PROCEDURE — 1123F ACP DISCUSS/DSCN MKR DOCD: CPT | Performed by: INTERNAL MEDICINE

## 2022-09-07 PROCEDURE — 99214 OFFICE O/P EST MOD 30 MIN: CPT | Performed by: INTERNAL MEDICINE

## 2022-09-07 PROCEDURE — 3044F HG A1C LEVEL LT 7.0%: CPT | Performed by: INTERNAL MEDICINE

## 2022-09-07 RX ORDER — MINERAL OIL
ENEMA (ML) RECTAL
COMMUNITY

## 2022-09-07 NOTE — PROGRESS NOTES
Chief Complaint   Patient presents with    Diabetes     Pcp and pharmacy verified       History of Present Illness: Sara Waters is a 74 y.o. male here for follow up of diabetes. He was diagnosed with diabetes around 2005. At our last visit in April 2022 his A1C was down to 7.4%. I increased his Trulicity to 9.5ZC weekly. Pt denies any recent illnesses, injuries or hospitalizations. His A1C last week was down to 6.9%. His weight today was down from 208 pounds in April 2022 to 198 pounds. He is currently taking Metformin XR 1000mg BID, Actos 30mg HS, Glipizide 10mg BID, Farxiga 5mg daily and Trulicity 9.2VJ weekly. He is still taking Lyrica 75mg once per day, in the morning. He notes that has been helping with his leg pain. He is not checking his BGs regularly. He denies issues of hypoglycemia. He has been taking his phentermine 1/2 pill 30 minutes before dinner. He notes with the 1/2 pill before dinner he is not as hungry in the evening and he notes his appetite the next morning is also suppressed. Pt is eating 2-3 meals daily  He has breakfast around 9AM, today he had a cup of coffee, but did not eat breakfast.   He will occasionally have a snack in the mid-morning. He has lunch around 1PM, today he had a tuna sandwich, chips and water. He had a banana around 2PM     He has dinner around 5-6PM, last night he had a pork chop, black eyed peas and water. No history of vascular disease. No history of retinopathy or neuropathy, but does have nephropathy (history of positive microalbuminuria and CKD). Last eye exam was May 2021. \"He said my eyes look good. \"  He is taking drops for his eye pressure. Pt denies any issues of numbness or burning in his feet. Pt saw Dr. Karely Martinez of GI, he had EGD and colonoscopy and gastric emptying stomach. He was found to have slow emptying of his stomach (gastroparesis) and a hiatal hernia.    He saw Dr. Karely Martinez and Ramone Flores said my stomach was looking better\". Current Outpatient Medications   Medication Sig    fexofenadine (ALLEGRA) 180 mg tablet Take  by mouth. simvastatin (ZOCOR) 20 mg tablet TAKE 1 TABLET NIGHTLY FOR CHOLESTEROL    dulaglutide (Trulicity) 3 JL/2.3 mL pnij 3 mg by SubCUTAneous route every seven (7) days. phentermine (ADIPEX-P) 37.5 mg tablet TAKE 1/2 TABLET BY MOUTH 30MIN BEFORE DINNER    Farxiga 5 mg tab tablet TAKE 1 TABLET DAILY    pregabalin (LYRICA) 75 mg capsule Take 75 mg by mouth daily. glipiZIDE (GLUCOTROL) 10 mg tablet Take 1 Tablet by mouth two (2) times a day. pioglitazone (ACTOS) 30 mg tablet TAKE 1 TABLET DAILY FOR DIABETES    metFORMIN ER (GLUCOPHAGE XR) 500 mg tablet TAKE 2 TABLETS TWICE A DAY FOR DIABETES    olmesartan (BENICAR) 20 mg tablet TAKE 1 TABLET DAILY (Patient taking differently: Take 10 mg by mouth daily.)    glucose blood VI test strips (OneTouch Verio test strips) strip Check sugars three times per day    apple cider vinegar 600 mg cap Take 1 Tab by mouth daily. cyanocobalamin 1,000 mcg tablet Take 1,000 mcg by mouth daily. aspirin delayed-release 81 mg tablet Take  by mouth daily. Takes 2 daily    lansoprazole (PREVACID) 30 mg capsule daily. ONETOUCH VERIO IQ METER misc USE TO CHECK BLOOD SUGAR THREE TIMES A DAY    mometasone (ELOCON) 0.1 % topical cream as needed. VIAGRA 100 mg tablet as needed. multivitamin (ONE A DAY) tablet Take 1 Tab by mouth daily. cinnamon bark 500 mg cap Take  by mouth two (2) times a day. No current facility-administered medications for this visit. Allergies   Allergen Reactions    Diclofenac Itching    Gabapentin Hives    Jardiance [Empagliflozin] Other (comments)     Polyuria    Penicillins Hives     Review of Systems:  - Eyes: no blurry vision or double vision  - Cardiovascular: no chest pain  - Respiratory: no shortness of breath  - Musculoskeletal: no myalgias, chronic shoulder pain.   - Neurological: no numbness/tingling in extremities    Physical Examination:  Blood pressure 98/63, pulse 92, height 5' 6\" (1.676 m), weight 198 lb 12.8 oz (90.2 kg). General: pleasant, no distress, good eye contact   Neck: no carotid bruits  Cardiovascular: regular, normal rate, nl s1 and s2, no m/r/g, 2+ DP pulses   Respiratory: clear bilaterally  Integumentary: no edema, no foot ulcers,  Psychiatric: normal mood and affect    Diabetic foot exam:     Left Foot:   Visual Exam: callous - present   Pulse DP: 2+ (normal)   Filament test: normal sensation    Vibratory sensation: normal      Right Foot:   Visual Exam: callous - present   Pulse DP: 2+ (normal)   Filament test: normal sensation    Vibratory sensation: normal      Data Reviewed:   Component      Latest Ref Rng & Units 8/27/2022   Glucose      65 - 99 mg/dL 94   BUN      8 - 27 mg/dL 14   Creatinine      0.76 - 1.27 mg/dL 1.24   eGFR      >59 mL/min/1.73 63   BUN/Creatinine ratio      10 - 24 11   Sodium      134 - 144 mmol/L 144   Potassium      3.5 - 5.2 mmol/L 4.6   Chloride      96 - 106 mmol/L 102   CO2      20 - 29 mmol/L 21   Calcium      8.6 - 10.2 mg/dL 9.2   Protein, total      6.0 - 8.5 g/dL 6.9   Albumin      3.8 - 4.8 g/dL 4.4   GLOBULIN, TOTAL      1.5 - 4.5 g/dL 2.5   A-G Ratio      1.2 - 2.2 1.8   Bilirubin, total      0.0 - 1.2 mg/dL 0.3   Alk. phosphatase      44 - 121 IU/L 111   AST      0 - 40 IU/L 21   ALT      0 - 44 IU/L 17   WBC      3.4 - 10.8 x10E3/uL 4.1   RBC      4.14 - 5.80 x10E6/uL 4.94   HGB      13.0 - 17.7 g/dL 14.2   HCT      37.5 - 51.0 % 43.3   MCV      79 - 97 fL 88   MCH      26.6 - 33.0 pg 28.7   MCHC      31.5 - 35.7 g/dL 32.8   RDW      11.6 - 15.4 % 14.6   PLATELET      104 - 383 x10E3/uL 204   Creatinine, urine      Not Estab. mg/dL 152.3   Microalbumin, urine      Not Estab. ug/mL 115.2   Microalbumin/Creat.  Ratio      0 - 29 mg/g creat 76 (H)   Hemoglobin A1c, (calculated)      4.8 - 5.6 % 6.9 (H)   Estimated average glucose      mg/dL 151   Vitamin B12      232 - 1,245 pg/mL 576   VITAMIN D, 25-HYDROXY      30.0 - 100.0 ng/mL 27.5 (L)       Assessment/Plan:   1) DM > His A1C has improved to 6.9%. Pt to continue the Trulicity 6.4WO weekly, Glipizide 10mg BID, Metformin XR 1000mg BID, Actos 30mg HS and Farxiga 5mg daily. Pt to continue the Phentermine 18.75mg with dinner. Pt to check his BGs twice per day. 2) HTN > His BP today was 98/63. He is on an ARB for renal protection. He has developed urine albuminuria, but the level has improved in the last 6 months from 170 down to 76.    3) HLD > His lipid panel in March 2022 was at goal. Pt is tolerating his Simvastatin 20mg well. 4) Hypovitaminosis D > His Vitamin D level last week was 27.5. 5) B-12 deficiency > His Vitamin B-12 last week was 576. Pt to continue the B-12 1000mcg daily. RTC 4 months    Pt voices understanding and agreement with the plan.   Pain noted and pt was recommended to call his PCP for further evaluation and treatment, as needed      Copy sent to:  Dr. Tawanna Vasquez

## 2022-09-07 NOTE — LETTER
9/7/2022    Patient: Myrtle Baig   YOB: 1955   Date of Visit: 9/7/2022     Franko Basilio MD  [facility]  V[address] [facility] [address]    Dear Franko Basilio MD,      Thank you for referring Mr. Vidhi Hooker to [address] [place] [address] for evaluation. My notes for this consultation are attached. If you have questions, please do not hesitate to call me. I look forward to following your patient along with you.       Sincerely,    Winter Tsai MD

## 2022-09-20 ENCOUNTER — OFFICE VISIT (OUTPATIENT)
Dept: SLEEP MEDICINE | Age: 67
End: 2022-09-20
Payer: COMMERCIAL

## 2022-09-20 VITALS
DIASTOLIC BLOOD PRESSURE: 80 MMHG | RESPIRATION RATE: 20 BRPM | WEIGHT: 203 LBS | OXYGEN SATURATION: 98 % | BODY MASS INDEX: 32.62 KG/M2 | HEART RATE: 90 BPM | TEMPERATURE: 97.6 F | HEIGHT: 66 IN | SYSTOLIC BLOOD PRESSURE: 125 MMHG

## 2022-09-20 DIAGNOSIS — G47.33 OBSTRUCTIVE SLEEP APNEA (ADULT) (PEDIATRIC): Primary | ICD-10-CM

## 2022-09-20 DIAGNOSIS — Z72.821 INADEQUATE SLEEP HYGIENE: ICD-10-CM

## 2022-09-20 DIAGNOSIS — E11.21 TYPE 2 DIABETES MELLITUS WITH DIABETIC NEPHROPATHY, WITHOUT LONG-TERM CURRENT USE OF INSULIN (HCC): ICD-10-CM

## 2022-09-20 DIAGNOSIS — I10 ESSENTIAL HYPERTENSION: ICD-10-CM

## 2022-09-20 PROCEDURE — 1123F ACP DISCUSS/DSCN MKR DOCD: CPT | Performed by: INTERNAL MEDICINE

## 2022-09-20 PROCEDURE — 99204 OFFICE O/P NEW MOD 45 MIN: CPT | Performed by: INTERNAL MEDICINE

## 2022-09-20 PROCEDURE — 3044F HG A1C LEVEL LT 7.0%: CPT | Performed by: INTERNAL MEDICINE

## 2022-09-20 NOTE — PROGRESS NOTES
[address] [facility] [place]. [place], [address]  Tel.  [phone]  Fax. 100 [facility] [address]  [place], [address] [facility]  Tel.  [phone]  Fax. [phone] [address]roy [address]  Tel.  [phone]  Fax. [phone]       Subjective: Walker Dailey is a 74 y.o. male who I am asked to see in consultation for evaluation and management of sleep apnea. He was diagnosed with sleep apnea 13 years ago. He is using his device regularly. He complains of needing a new machine associated with his device is over 11years old. Previous records not available. Symptoms began a few months ago, unchanged since that time. He usually can fall asleep in 30 minutes. Family or house members note snoring. He denies falling asleep while driving. There are minimal  mask comfort problems. The following problems are noted with PAP:     Drowsiness no Problems exhaling no   Snoring yes Forget to put on no   Mask Comfortable yes Can't fall asleep no   Dry Mouth no Mask falls off no   Air Leaking yes Frequent awakenings Yes-he looks at clock and it is always 3:30 am. He finds it hard to go back to sleep after this. Champ Ramirez does wake up frequently at night. He is bothered by waking up too early and left unable to get back to sleep. He actually sleeps about 5 hours at night and wakes up about 2 times during the night. He does work shifts:  First Shift. Champ indicates he does get too little sleep at night. His bedtime is 2300. He awakens at 0500. He does not take naps. He takes   naps a week lasting  . He has the following observed behaviors: Loud snoring, Twitching of legs or feet;  . 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   Compliance 97% on resmed airsense 10 set at 8-10 cmH20, Ahi flow estimate at 8/hour  [place] Sleepiness Score: 6       Allergies   Allergen Reactions Diclofenac Itching    Gabapentin Hives    Jardiance [Empagliflozin] Other (comments)     Polyuria    Penicillins Hives         Current Outpatient Medications:     fexofenadine (ALLEGRA) 180 mg tablet, Take  by mouth., Disp: , Rfl:     simvastatin (ZOCOR) 20 mg tablet, TAKE 1 TABLET NIGHTLY FOR CHOLESTEROL, Disp: 90 Tablet, Rfl: 3    dulaglutide (Trulicity) 3 XL/3.6 mL pnij, 3 mg by SubCUTAneous route every seven (7) days. , Disp: 12 Each, Rfl: 3    phentermine (ADIPEX-P) 37.5 mg tablet, TAKE 1/2 TABLET BY MOUTH 30MIN BEFORE DINNER, Disp: 100 Tablet, Rfl: 0    Farxiga 5 mg tab tablet, TAKE 1 TABLET DAILY, Disp: 90 Tablet, Rfl: 3    pregabalin (LYRICA) 75 mg capsule, Take 75 mg by mouth daily. , Disp: , Rfl:     glipiZIDE (GLUCOTROL) 10 mg tablet, Take 1 Tablet by mouth two (2) times a day., Disp: 180 Tablet, Rfl: 3    pioglitazone (ACTOS) 30 mg tablet, TAKE 1 TABLET DAILY FOR DIABETES, Disp: 90 Tablet, Rfl: 3    metFORMIN ER (GLUCOPHAGE XR) 500 mg tablet, TAKE 2 TABLETS TWICE A DAY FOR DIABETES, Disp: 360 Tablet, Rfl: 3    glucose blood VI test strips (OneTouch Verio test strips) strip, Check sugars three times per day, Disp: 300 Strip, Rfl: 3    apple cider vinegar 600 mg cap, Take 1 Tab by mouth daily. , Disp: , Rfl:     cyanocobalamin 1,000 mcg tablet, Take 1,000 mcg by mouth daily. , Disp: , Rfl:     aspirin delayed-release 81 mg tablet, Take  by mouth daily. Takes 2 daily, Disp: , Rfl:     lansoprazole (PREVACID) 30 mg capsule, daily. , Disp: , Rfl:     ONETOUCH VERIO IQ METER misc, USE TO CHECK BLOOD SUGAR THREE TIMES A DAY, Disp: 1 Each, Rfl: 0    mometasone (ELOCON) 0.1 % topical cream, as needed. , Disp: , Rfl:     VIAGRA 100 mg tablet, as needed. , Disp: , Rfl:     multivitamin (ONE A DAY) tablet, Take 1 Tab by mouth daily. , Disp: , Rfl:     cinnamon bark 500 mg cap, Take  by mouth two (2) times a day., Disp: , Rfl:     olmesartan (BENICAR) 20 mg tablet, TAKE 1 TABLET DAILY (Patient taking differently: Take 10 mg by mouth daily.), Disp: 90 Tablet, Rfl: 3     He  has a past medical history of Diabetes ([address] High cholesterol, and Hypertension. He  has a past surgical history that includes hx endoscopy; hx colonoscopy; and colonoscopy (N/A, 5/22/2020). He family history includes Diabetes in his maternal grandmother, mother, and sister; No Known Problems in his brother, brother, brother, brother, brother, and sister; Stroke in his father; Thyroid Disease in his mother. He  reports that he has never smoked. He has never used smokeless tobacco. He reports current alcohol use. He reports that he does not use drugs. Review of Systems:  Constitutional:  No significant weight loss or weight gain. Eyes:  No blurred vision. CVS:  No significant chest pain  Pulm:  No significant shortness of breath  GI:  No significant nausea or vomiting  :  No significant nocturia  Musculoskeletal:  occasional shoulder pain at night  Skin:  No significant rashes  Neuro:  No significant dizziness   Psych:  No active mood issues    Sleep Review of Systems: notable for some difficulty falling asleep;+frequent awakenings at night;  regular dreaming noted; no nightmares ;+early morning headaches ; no memory problems; no concentration issues; no history of any automobile or occupational accidents due to daytime drowsiness.       Objective:   Visit Vitals  /80 (BP 1 Location: Right arm, BP Patient Position: Sitting, BP Cuff Size: Large adult)   Pulse 90   Temp 97.6 °F (36.4 °C) (Temporal)   Resp 20   Ht 5' 6\" (1.676 m)   Wt 203 lb (92.1 kg)   SpO2 98%   BMI 32.77 kg/m²         General:   Not in acute distress   Eyes:  Anicteric sclerae, no obvious strabismus   Nose:  No obvious nasal septum deviation    Oropharynx:   Class 3 oropharyngeal outlet, thick tongue base, enlarged and boggy uvula, low-lying soft palate, narrow tonsilo-pharyngeal pilars   Tonsils:   tonsils are present and normal   Neck:    ; midline trachea   Chest/Lungs: Equal lung expansion, clear on auscultation    CVS:  Normal rate, regular rhythm; no JVD   Skin:  Warm to touch; no obvious rashes   Neuro:  No focal deficits ; no obvious tremor    Psych:  Normal affect,  normal countenance;          Assessment:       ICD-10-CM ICD-9-CM    1. Obstructive sleep apnea (adult) (pediatric)  G47.33 327.23 SPLIT CPAP/PSG      CANCELED: SPLIT CPAP/PSG      2. Inadequate sleep hygiene  Z72.821 307.49       3. Essential hypertension  I10 401.9       4. Type 2 diabetes mellitus with diabetic nephropathy, without long-term current use of insulin ([facility])  E11.21 250.40      583.81           Plan:       Split night testing ordered. Split at AHI 15/hour. Start PAP at 8 cmH20. AHi flow on download elevated. Will reestablish diagnosis and optimize pressure setting. When results available. I will order a new machine for him. he is compliant with PAP therapy and PAP continues to benefit patient and remains necessary for control of his sleep apnea. Patient's PAP device has exceeded its  Lifespan. Replacement device will be ordered. I have counseled the patient regarding the benefits of weight loss. Counseling was provided regarding the importance of regular PAP use and on proper sleep hygiene and safe driving. 2. Inadequate sleep hygiene- I have advised a regular sleep wake cycle. he  was advised to avoid looking at the clock during the night. Ideally, the clock face should be turned away. he was advised to minimize caffeine use and to avoid caffeine-containing beverages 8 hours prior to bedtime. Naps were discouraged. A regular exercise schedule, at least 3 hours before bedtime, would be beneficial to improving sleep quality. Watching TV, using laptops, tablets and smartphones in the evening was discouraged. he  was advised to keep the bedroom cool and dark. 3. Hypertension - controlled on current regimen. he will continue his current regimen.  he will continue to monitor at home and with his PMD for further adjustments as needed. I have reviewed the relationship between hypertension as it relates to sleep-disordered breathing. 4. Type 2 diabetes - controlled . he continues his current regimen. he will continue to monitor at home and with his PMD.  I have reviewed the relationship between sleep disordered breathing as it relates to diabetes. All of his questions were addressed. The treatment plan was reviewed with the patient in detail . he understands that the lead technologist will be calling him  with the results or notifying of results via [address] [place] [address] and assisting with the next step in the treatment plan as outlined today during the consultation with me. All of his questions were addressed. Thank you for allowing us to participate in your patient's medical care.   Electronically signed by    Beltran Bai MD  Diplomate in Sleep Medicine  [facility]  9/20/2022

## 2022-09-20 NOTE — PATIENT INSTRUCTIONS
[address] [place]. [place], [address]  Tel.  [phone]  Fax. 100 [facility] 60  [place], [address] [facility]  Tel.  [phone]  Fax. [phone] [address]  Tel.  [phone]  Fax. [phone]     Sleep Apnea: After Your Visit  Your Care Instructions  Sleep apnea occurs when you frequently stop breathing for 10 seconds or longer during sleep. It can be mild to severe, based on the number of times per hour that you stop breathing or have slowed breathing. Blocked or narrowed airways in your nose, mouth, or throat can cause sleep apnea. Your airway can become blocked when your throat muscles and tongue relax during sleep. Sleep apnea is common, occurring in 1 out of 20 individuals. Individuals having any of the following characteristics should be evaluated and treated right away due to high risk and detrimental consequences from untreated sleep apnea:  Obesity  Congestive Heart failure  Atrial Fibrillation  Uncontrolled Hypertension  Type II Diabetes  Night-time Arrhythmias  Stroke  Pulmonary Hypertension  High-risk Driving Populations (pilots, truck drivers, etc.)  Patients Considering Weight-loss Surgery    How do you know you have sleep apnea? You probably have sleep apnea if you answer 'yes' to 3 or more of the following questions:  S - Have you been told that you Snore? T - Are you often Tired during the day? O - Has anyone Observed you stop breathing while sleeping? P- Do you have (or are being treated for) high blood Pressure? B - Are you obese (Body Mass Index > 35)? A - Is your Age 48years old or older? N - Is your Neck size greater than 16 inches? G - Are you male Gender? A sleep physician can prescribe a breathing device that prevents tissues in the throat from blocking your airway. Or your doctor may recommend using a dental device (oral breathing device) to help keep your airway open.  In some cases, surgery may be needed to remove enlarged tissues in the throat. Follow-up care is a key part of your treatment and safety. Be sure to make and go to all appointments, and call your doctor if you are having problems. It's also a good idea to know your test results and keep a list of the medicines you take. How can you care for yourself at home? Lose weight, if needed. It may reduce the number of times you stop breathing or have slowed breathing. Go to bed at the same time every night. Sleep on your side. It may stop mild apnea. If you tend to roll onto your back, sew a pocket in the back of your paSkyJam top. Put a tennis ball into the pocket, and stitch the pocket shut. This will help keep you from sleeping on your back. Avoid alcohol and medicines such as sleeping pills and sedatives before bed. Do not smoke. Smoking can make sleep apnea worse. If you need help quitting, talk to your doctor about stop-smoking programs and medicines. These can increase your chances of quitting for good. Prop up the head of your bed 4 to 6 inches by putting bricks under the legs of the bed. Treat breathing problems, such as a stuffy nose, caused by a cold or allergies. Use a continuous positive airway pressure (CPAP) breathing machine if lifestyle changes do not help your apnea and your doctor recommends it. The machine keeps your airway from closing when you sleep. If CPAP does not help you, ask your doctor whether you should try other breathing machines. A bilevel positive airway pressure machine has two types of air pressureâone for breathing in and one for breathing out. Another device raises or lowers air pressure as needed while you breathe. If your nose feels dry or bleeds when using one of these machines, talk with your doctor about increasing moisture in the air. A humidifier may help.   If your nose is runny or stuffy from using a breathing machine, talk with your doctor about using decongestants or a corticosteroid nasal spray.  When should you call for help? Watch closely for changes in your health, and be sure to contact your doctor if:  You still have sleep apnea even though you have made lifestyle changes. You are thinking of trying a device such as CPAP. You are having problems using a CPAP or similar machine. Where can you learn more? Go to [employer]. Enter X646 in the search box to learn more about \"Sleep Apnea: After Your Visit. \"   © 3866-8217 Healthwise, Incorporated. Care instructions adapted under license by Nina December (which disclaims liability or warranty for this information). This care instruction is for use with your licensed healthcare professional. If you have questions about a medical condition or this instruction, always ask your healthcare professional. Sravanthi Luz any warranty or liability for your use of this information. PROPER SLEEP HYGIENE    What to avoid  Do not have drinks with caffeine, such as coffee or black tea, for 8 hours before bed. Do not smoke or use other types of tobacco near bedtime. Nicotine is a stimulant and can keep you awake. Avoid drinking alcohol late in the evening, because it can cause you to wake in the middle of the night. Do not eat a big meal close to bedtime. If you are hungry, eat a light snack. Do not drink a lot of water close to bedtime, because the need to urinate may wake you up during the night. Do not read or watch TV in bed. Use the bed only for sleeping and sexual activity. What to try  Go to bed at the same time every night, and wake up at the same time every morning. Do not take naps during the day. Keep your bedroom quiet, dark, and cool. Get regular exercise, but not within 3 to 4 hours of your bedtime. .  Sleep on a comfortable pillow and mattress. If watching the clock makes you anxious, turn it facing away from you so you cannot see the time.   If you worry when you lie down, start a worry book. Well before bedtime, write down your worries, and then set the book and your concerns aside. Try meditation or other relaxation techniques before you go to bed. If you cannot fall asleep, get up and go to another room until you feel sleepy. Do something relaxing. Repeat your bedtime routine before you go to bed again. Make your house quiet and calm about an hour before bedtime. Turn down the lights, turn off the TV, log off the computer, and turn down the volume on music. This can help you relax after a busy day. Drowsy Driving  The [address] [place] [address] Road Traffic Safety Administration cites drowsiness as a causing factor in more than 293,566 police reported crashes annually, resulting in 76,000 injuries and 1,500 deaths. Other surveys suggest 55% of people polled have driven while drowsy in the past year, 23% had fallen asleep but not crashed, 3% crashed, and 2% had and accident due to drowsy driving. Who is at risk? Young Drivers: One study of drowsy driving accidents states that 55% of the drivers were under 25 years. Of those, 75% were male. Shift Workers and Travelers: People who work overnight or travel across time zones frequently are at higher risk of experiencing Circadian Rhythm Disorders. They are trying to work and function when their body is programed to sleep. Sleep Deprived: Lack of sleep has a serious impact on your ability to pay attention or focus on a task. Consistently getting less than the average of 8 hours your body needs creates partial or cumulative sleep deprivation. Untreated Sleep Disorders: Sleep Apnea, Narcolepsy, R.L.S., and other sleep disorders (untreated) prevent a person from getting enough restful sleep. This leads to excessive daytime sleepiness and increases the risk for drowsy driving accidents by up to 7 times. Medications / Alcohol: Even over the counter medications can cause drowsiness.  Medications that impair a drivers attention should have a warning label. Alcohol naturally makes you sleepy and on its own can cause accidents. Combined with excessive drowsiness its effects are amplified. Signs of Drowsy Driving:   * You don't remember driving the last few miles   * You may drift out of your whit   * You are unable to focus and your thoughts wander   * You may yawn more often than normal   * You have difficulty keeping your eyes open / nodding off   * Missing traffic signs, speeding, or tailgating  Prevention-   Good sleep hygiene, lifestyle and behavioral choices have the most impact on drowsy driving. There is no substitute for sleep and the average person requires 8 hours nightly. If you find yourself driving drowsy, stop and sleep. Consider the sleep hygiene tips provided during your visit as well. Medication Refill Policy: Refills for all medications require 1 week advance notice. Please have your pharmacy fax a refill request. We are unable to fax, or call in \"controled substance\" medications and you will need to pick these prescriptions up from our office. App[employer]ando Activation    Thank you for requesting access to [employer]. Please follow the instructions below to securely access and download your online medical record. App[employer]ando allows you to send messages to your doctor, view your test results, renew your prescriptions, schedule appointments, and more. How Do I Sign Up? In your internet browser, go to [email or web address]. Click on the First Time User? Click Here link in the Sign In box. You will see the New Member Sign Up page. Enter your App[employer]ando Access Code exactly as it appears below. You will not need to use this code after youve completed the sign-up process. If you do not sign up before the expiration date, you must request a new code. App[employer]ando Access Code:  Activation code not generated  Current App[employer]ando Status: Active (This is the date your App[employer]ando access code will )    Enter the last four digits of your Social Security Number (xxxx) and Date of Birth (mm/dd/yyyy) as indicated and click Submit. You will be taken to the next sign-up page. Create a AskBot ID. This will be your AskBot login ID and cannot be changed, so think of one that is secure and easy to remember. Create a AskBot password. You can change your password at any time. Enter your Password Reset Question and Answer. This can be used at a later time if you forget your password. Enter your e-mail address. You will receive e-mail notification when new information is available in [address] Click Sign Up. You can now view and download portions of your medical record. Click the [employer] link to download a portable copy of your medical information. Additional Information    If you have questions, please call [phone]. Remember, AskBot is NOT to be used for urgent needs. For medical emergencies, dial 911.

## 2022-09-20 NOTE — Clinical Note
Thank you for the referral.  I will keep you informed of his progress.  [address], Starr [place]

## 2022-09-23 DIAGNOSIS — E11.21 TYPE 2 DIABETES MELLITUS WITH DIABETIC NEPHROPATHY, WITHOUT LONG-TERM CURRENT USE OF INSULIN (HCC): ICD-10-CM

## 2022-09-23 RX ORDER — PHENTERMINE HYDROCHLORIDE 37.5 MG/1
TABLET ORAL
Qty: 15 TABLET | Refills: 0 | Status: SHIPPED | OUTPATIENT
Start: 2022-09-23

## 2022-11-02 RX ORDER — PIOGLITAZONEHYDROCHLORIDE 30 MG/1
TABLET ORAL
Qty: 90 TABLET | Refills: 3 | Status: SHIPPED | OUTPATIENT
Start: 2022-11-02

## 2022-11-02 RX ORDER — METFORMIN HYDROCHLORIDE 500 MG/1
TABLET, EXTENDED RELEASE ORAL
Qty: 360 TABLET | Refills: 3 | Status: SHIPPED | OUTPATIENT
Start: 2022-11-02

## 2022-11-11 ENCOUNTER — HOSPITAL ENCOUNTER (OUTPATIENT)
Dept: SLEEP MEDICINE | Age: 67
Discharge: HOME | End: 2022-11-11
Payer: COMMERCIAL

## 2022-11-11 VITALS
TEMPERATURE: 97.9 F | BODY MASS INDEX: 32.62 KG/M2 | WEIGHT: 203 LBS | DIASTOLIC BLOOD PRESSURE: 70 MMHG | HEIGHT: 66 IN | SYSTOLIC BLOOD PRESSURE: 116 MMHG | HEART RATE: 92 BPM | OXYGEN SATURATION: 94 %

## 2022-11-11 DIAGNOSIS — G47.33 OBSTRUCTIVE SLEEP APNEA (ADULT) (PEDIATRIC): ICD-10-CM

## 2022-11-11 PROCEDURE — 95811 POLYSOM 6/>YRS CPAP 4/> PARM: CPT | Performed by: INTERNAL MEDICINE

## 2022-11-12 ENCOUNTER — DOCUMENTATION ONLY (OUTPATIENT)
Dept: SLEEP MEDICINE | Age: 67
End: 2022-11-12

## 2022-11-12 NOTE — PROGRESS NOTES
[address] [facility] [place]. [place], [address]  Tel.  [phone]  Fax. 100 [facility] 60  [place], [address] [facility]  Tel.  [phone]  Fax. [phone] [address] [facility] Kenroy Escobedo   Tel.  [phone]  Fax. [phone]     Sleep Study Technical Notes        PRE-Test:  Linden Cordova (: 1955) arrived in the lobby. Patient was greeted and screening questions asked. The patient was taken to the Sleep Center and taken directly to his/her room. Vitals:  Temperature (97.9)   BP (116/70)   SaO2 (94)   Weight per patient (203)  Procedure explained to the patient and questions were answered. The patient expressed understanding of the procedure. Electrodes were applied without incident. The patient was placed in bed and the study was started. Acquisition Notes:  Lights off: 9:26pm    Respiratory events: hypops  ECG:  NSR  Snoring:  moderate   PAP titration: CPAP 11  Mask(s) Used: F&P Simplus FFM size medium  Other comments:   Patient to bathroom 0 times      POST Test:  Patient was awakened. Electrodes were removed. The patient was discharged after answering the Post Questionnaire. Patient stated that he was alert and ok to drive. Equipment and room cleaned per infection control policy.

## 2022-11-23 ENCOUNTER — TELEPHONE (OUTPATIENT)
Dept: SLEEP MEDICINE | Age: 67
End: 2022-11-23

## 2022-11-23 DIAGNOSIS — G47.33 OBSTRUCTIVE SLEEP APNEA (ADULT) (PEDIATRIC): Primary | ICD-10-CM

## 2022-11-27 NOTE — TELEPHONE ENCOUNTER
Results of sleep study in R-drive  Lead tech to convey results to patient    Results of sleep study in [employer] tech to convey results to patient    During first part of study, he fell asleep in 1.4 minutes. Stages 1,2,3 and REM observed. Moderate snoring. Sleep study started as a diagnostic polysomnogram during which an AHI of 18/hour was found. Lowest oxygen saturation was 76%. PAP therapy applied to control apnea/respiratory events. . At a setting of CPAP 11 cmH20, respiratory events controlled. AHI at optimal pressure was 8/hour and oxygen levels stayed at or above 90%. . he appeared to tolerate PAP well. As discussed at appointment, I will order a PAP device for his home use. It will start a little lower than best pressure attained in sleep center (for comfort) and will adjust up to 12 cmH20 (slightly higher than highest setting in lab) during sleep. Patient should call the office the day he gets set up with new PAP device so we can schedule him for an adherence/compliance visit within 31-90 days of obtaining a new device. he will need a first adherence visit. Order attached  Staff to kindly send to dme and inform patient of necessary details pertaining to PAP order and follow-up.

## 2022-11-29 NOTE — TELEPHONE ENCOUNTER
Results have been sent to  [facility]. Fax DME order & Schedule 1st adherence visit in 60 to 90 days.

## 2022-11-30 ENCOUNTER — DOCUMENTATION ONLY (OUTPATIENT)
Dept: SLEEP MEDICINE | Age: 67
End: 2022-11-30

## 2022-12-02 ENCOUNTER — DOCUMENTATION ONLY (OUTPATIENT)
Dept: SLEEP MEDICINE | Age: 67
End: 2022-12-02

## 2022-12-02 NOTE — PROGRESS NOTES
Patients wife, Hermelindo Limon, called and spoke to [facility] requesting that she and her  have the same DME. Mr. Yuliana Beal order has been sent to [facility], scanned into media.

## 2022-12-12 ENCOUNTER — TELEPHONE (OUTPATIENT)
Dept: SLEEP MEDICINE | Age: 67
End: 2022-12-12

## 2022-12-12 DIAGNOSIS — G47.33 OBSTRUCTIVE SLEEP APNEA (ADULT) (PEDIATRIC): Primary | ICD-10-CM

## 2022-12-12 NOTE — TELEPHONE ENCOUNTER
Luzma with [facility] called requesting an order for a full face mask.  Patient wants full face but order is for Nasal. Her contact [phone] ext [phone] f: [phone]

## 2022-12-30 ENCOUNTER — DOCUMENTATION ONLY (OUTPATIENT)
Dept: SLEEP MEDICINE | Age: 67
End: 2022-12-30

## 2022-12-30 LAB
25(OH)D3+25(OH)D2 SERPL-MCNC: 24.4 NG/ML (ref 30–100)
ALBUMIN SERPL-MCNC: 4.2 G/DL (ref 3.8–4.8)
ALBUMIN/CREAT UR: 114 MG/G CREAT (ref 0–29)
ALBUMIN/GLOB SERPL: 1.6 {RATIO} (ref 1.2–2.2)
ALP SERPL-CCNC: 120 IU/L (ref 44–121)
ALT SERPL-CCNC: 22 IU/L (ref 0–44)
AST SERPL-CCNC: 28 IU/L (ref 0–40)
BILIRUB SERPL-MCNC: 0.3 MG/DL (ref 0–1.2)
BUN SERPL-MCNC: 16 MG/DL (ref 8–27)
BUN/CREAT SERPL: 13 (ref 10–24)
CALCIUM SERPL-MCNC: 9.5 MG/DL (ref 8.6–10.2)
CHLORIDE SERPL-SCNC: 102 MMOL/L (ref 96–106)
CHOLEST SERPL-MCNC: 158 MG/DL (ref 100–199)
CO2 SERPL-SCNC: 26 MMOL/L (ref 20–29)
CREAT SERPL-MCNC: 1.27 MG/DL (ref 0.76–1.27)
CREAT UR-MCNC: 116 MG/DL
EGFR: 61 ML/MIN/1.73
EST. AVERAGE GLUCOSE BLD GHB EST-MCNC: 154 MG/DL
GLOBULIN SER CALC-MCNC: 2.7 G/DL (ref 1.5–4.5)
GLUCOSE SERPL-MCNC: 116 MG/DL (ref 70–99)
HBA1C MFR BLD: 7 % (ref 4.8–5.6)
HDLC SERPL-MCNC: 59 MG/DL
IMP & REVIEW OF LAB RESULTS: NORMAL
LDLC SERPL CALC-MCNC: 75 MG/DL (ref 0–99)
MICROALBUMIN UR-MCNC: 132.8 UG/ML
POTASSIUM SERPL-SCNC: 4.6 MMOL/L (ref 3.5–5.2)
PROT SERPL-MCNC: 6.9 G/DL (ref 6–8.5)
SODIUM SERPL-SCNC: 141 MMOL/L (ref 134–144)
TESTOST FREE SERPL-MCNC: 12.1 PG/ML (ref 6.6–18.1)
TESTOST SERPL-MCNC: 449 NG/DL (ref 264–916)
TRIGL SERPL-MCNC: 140 MG/DL (ref 0–149)
VIT B12 SERPL-MCNC: 454 PG/ML (ref 232–1245)
VLDLC SERPL CALC-MCNC: 24 MG/DL (ref 5–40)

## 2023-01-01 DIAGNOSIS — E11.21 TYPE 2 DIABETES MELLITUS WITH DIABETIC NEPHROPATHY, WITHOUT LONG-TERM CURRENT USE OF INSULIN (HCC): ICD-10-CM

## 2023-01-01 DIAGNOSIS — E53.8 B12 DEFICIENCY: ICD-10-CM

## 2023-01-01 DIAGNOSIS — I10 ESSENTIAL HYPERTENSION: ICD-10-CM

## 2023-01-01 DIAGNOSIS — E78.2 MIXED HYPERLIPIDEMIA: ICD-10-CM

## 2023-01-01 DIAGNOSIS — E55.9 VITAMIN D DEFICIENCY: ICD-10-CM

## 2023-01-13 RX ORDER — GLIPIZIDE 10 MG/1
TABLET ORAL
Qty: 180 TABLET | Refills: 3 | Status: SHIPPED | OUTPATIENT
Start: 2023-01-13

## 2023-01-13 RX ORDER — OLMESARTAN MEDOXOMIL 20 MG/1
TABLET ORAL
Qty: 90 TABLET | Refills: 3 | Status: SHIPPED | OUTPATIENT
Start: 2023-01-13

## 2023-01-13 RX ORDER — DAPAGLIFLOZIN 5 MG/1
TABLET, FILM COATED ORAL
Qty: 90 TABLET | Refills: 3 | Status: SHIPPED | OUTPATIENT
Start: 2023-01-13

## 2023-01-18 ENCOUNTER — OFFICE VISIT (OUTPATIENT)
Dept: ENDOCRINOLOGY | Age: 68
End: 2023-01-18
Payer: COMMERCIAL

## 2023-01-18 VITALS
HEIGHT: 66 IN | SYSTOLIC BLOOD PRESSURE: 99 MMHG | HEART RATE: 91 BPM | DIASTOLIC BLOOD PRESSURE: 63 MMHG | BODY MASS INDEX: 33.23 KG/M2 | WEIGHT: 206.8 LBS

## 2023-01-18 DIAGNOSIS — I10 ESSENTIAL HYPERTENSION: ICD-10-CM

## 2023-01-18 DIAGNOSIS — E78.2 MIXED HYPERLIPIDEMIA: ICD-10-CM

## 2023-01-18 DIAGNOSIS — E53.8 B12 DEFICIENCY: ICD-10-CM

## 2023-01-18 DIAGNOSIS — E55.9 VITAMIN D DEFICIENCY: ICD-10-CM

## 2023-01-18 DIAGNOSIS — E11.21 TYPE 2 DIABETES MELLITUS WITH DIABETIC NEPHROPATHY, WITHOUT LONG-TERM CURRENT USE OF INSULIN (HCC): Primary | ICD-10-CM

## 2023-01-18 PROCEDURE — 3074F SYST BP LT 130 MM HG: CPT | Performed by: INTERNAL MEDICINE

## 2023-01-18 PROCEDURE — 99214 OFFICE O/P EST MOD 30 MIN: CPT | Performed by: INTERNAL MEDICINE

## 2023-01-18 PROCEDURE — 1123F ACP DISCUSS/DSCN MKR DOCD: CPT | Performed by: INTERNAL MEDICINE

## 2023-01-18 PROCEDURE — 3078F DIAST BP <80 MM HG: CPT | Performed by: INTERNAL MEDICINE

## 2023-01-18 RX ORDER — PHENTERMINE HYDROCHLORIDE 37.5 MG/1
18.75 TABLET ORAL
Qty: 100 TABLET | Refills: 1 | Status: SHIPPED | OUTPATIENT
Start: 2023-01-18

## 2023-01-18 NOTE — PROGRESS NOTES
Chief Complaint   Patient presents with    Diabetes     Pcp and pharmacy verified    Weight Management     History of Present Illness: Brady Fallon is a 69 y.o. male here for follow up of diabetes. He was diagnosed with diabetes around 2005. At our last visit September 2022 his A1C had improved to 6.9%. Pt was instructed to continue the Trulicity 7.8WN weekly, Glipizide 10mg BID, Metformin XR 1000mg BID, Actos 30mg HS and Farxiga 5mg daily. Pt to continue the Phentermine 18.75mg with dinner. His A1C in December 2022 was 7.0%    He is currently taking Metformin XR 1000mg BID, Actos 30mg HS, Glipizide 10mg BID, Farxiga 5mg daily and Trulicity 0.2LI weekly. He is still taking Lyrica 75mg twice per day, in the morning. He notes that has been helping with his leg pain. He is not checking his BGs regularly. He denies issues of hypoglycemia. He has been taking his phentermine 1/2 pill 30 minutes before dinner. He notes with the 1/2 pill before dinner he is not as hungry in the evening and he notes his appetite the next morning is also suppressed. Pt is eating 2-3 meals daily  He has breakfast around 9AM, today he had and egg and sausage sandwich and coffee  He will occasionally have a snack in the mid-morning. He has lunch around 1PM, today he had a ham and turkey sandwich, chips and water. He had a banana around 2PM     He has dinner around 5-6PM, last night he did not eat dinner \"I had a large lunch so I did not eat dinner. \"           No history of vascular disease. No history of retinopathy or neuropathy, but does have nephropathy (history of positive microalbuminuria and CKD). Last eye exam was May 2022. \"He said my eyes look good. \"  He is taking drops for his eye pressure. Pt denies any issues of numbness or burning in his feet. Pt saw Dr. Molly Wright of GI, he had EGD and colonoscopy and gastric emptying stomach.  He was found to have slow emptying of his stomach (gastroparesis) and a hiatal hernia. He saw Dr. Selene Soulier and Harlan Neal said my stomach was looking better\". Current Outpatient Medications   Medication Sig    phentermine (ADIPEX-P) 37.5 mg tablet Take 0.5 Tablets by mouth every morning. Max Daily Amount: 18.75 mg. Farxiga 5 mg tab tablet TAKE 1 TABLET DAILY    glipiZIDE (GLUCOTROL) 10 mg tablet TAKE 1 TABLET TWICE A DAY    olmesartan (BENICAR) 20 mg tablet TAKE 1 TABLET DAILY (Patient taking differently: Take 10 mg by mouth.)    metFORMIN ER (GLUCOPHAGE XR) 500 mg tablet TAKE 2 TABLETS TWICE A DAY FOR DIABETES    pioglitazone (ACTOS) 30 mg tablet TAKE 1 TABLET DAILY FOR DIABETES    fexofenadine (ALLEGRA) 180 mg tablet Take  by mouth. simvastatin (ZOCOR) 20 mg tablet TAKE 1 TABLET NIGHTLY FOR CHOLESTEROL    dulaglutide (Trulicity) 3 FC/4.0 mL pnij 3 mg by SubCUTAneous route every seven (7) days. pregabalin (LYRICA) 75 mg capsule Take 75 mg by mouth daily. glucose blood VI test strips (OneTouch Verio test strips) strip Check sugars three times per day    apple cider vinegar 600 mg cap Take 1 Tab by mouth daily. cyanocobalamin 1,000 mcg tablet Take 1,000 mcg by mouth daily. aspirin delayed-release 81 mg tablet Take  by mouth daily. Takes 2 daily    lansoprazole (PREVACID) 30 mg capsule daily. ONETOUCH VERIO IQ METER misc USE TO CHECK BLOOD SUGAR THREE TIMES A DAY    mometasone (ELOCON) 0.1 % topical cream as needed. VIAGRA 100 mg tablet as needed. multivitamin (ONE A DAY) tablet Take 1 Tab by mouth daily. cinnamon bark 500 mg cap Take  by mouth two (2) times a day. No current facility-administered medications for this visit.      Allergies   Allergen Reactions    Diclofenac Itching    Gabapentin Hives    Jardiance [Empagliflozin] Other (comments)     Polyuria    Penicillins Hives     Review of Systems:  - Eyes: no blurry vision or double vision  - Cardiovascular: no chest pain  - Respiratory: no shortness of breath  - Musculoskeletal: no myalgias, chronic shoulder pain. - Neurological: no numbness/tingling in extremities    Physical Examination:  Blood pressure 99/63, pulse 91, height 5' 6\" (1.676 m), weight 206 lb 12.8 oz (93.8 kg). General: pleasant, no distress, good eye contact   Neck: no carotid bruits  Cardiovascular: regular, normal rate, nl s1 and s2, no m/r/g, 2+ DP pulses   Respiratory: clear bilaterally  Integumentary: no edema, no foot ulcers,  Psychiatric: normal mood and affect    Diabetic foot exam:     Left Foot:   Visual Exam: callous - present   Pulse DP: 2+ (normal)   Filament test: normal sensation    Vibratory sensation: normal      Right Foot:   Visual Exam: callous - present   Pulse DP: 2+ (normal)   Filament test: normal sensation    Vibratory sensation: normal      Data Reviewed:   Component      Latest Ref Rng & Units 12/29/2022   Glucose      70 - 99 mg/dL 116 (H)   BUN      8 - 27 mg/dL 16   Creatinine      0.76 - 1.27 mg/dL 1.27   eGFR      >59 mL/min/1.73 61   BUN/Creatinine ratio      10 - 24 13   Sodium      134 - 144 mmol/L 141   Potassium      3.5 - 5.2 mmol/L 4.6   Chloride      96 - 106 mmol/L 102   CO2      20 - 29 mmol/L 26   Calcium      8.6 - 10.2 mg/dL 9.5   Protein, total      6.0 - 8.5 g/dL 6.9   Albumin      3.8 - 4.8 g/dL 4.2   GLOBULIN, TOTAL      1.5 - 4.5 g/dL 2.7   A-G Ratio      1.2 - 2.2 1.6   Bilirubin, total      0.0 - 1.2 mg/dL 0.3   Alk. phosphatase      44 - 121 IU/L 120   AST      0 - 40 IU/L 28   ALT      0 - 44 IU/L 22   Cholesterol, total      100 - 199 mg/dL 158   Triglyceride      0 - 149 mg/dL 140   HDL Cholesterol      >39 mg/dL 59   VLDL, calculated      5 - 40 mg/dL 24   LDL, calculated      0 - 99 mg/dL 75   Creatinine, urine random      Not Estab. mg/dL 116.0   Microalbumin, urine      Not Estab. ug/mL 132.8   Microalbumin/Creat.  Ratio      0 - 29 mg/g creat 114 (H)   Testosterone      264 - 916 ng/dL 449   Free testosterone (Direct)      6.6 - 18.1 pg/mL 12.1   Hemoglobin A1c, (calculated)      4.8 - 5.6 % 7.0 (H)   Estimated average glucose      mg/dL 154   VITAMIN D, 25-HYDROXY      30.0 - 100.0 ng/mL 24.4 (L)   Vitamin B12      232 - 1,245 pg/mL 454       Assessment/Plan:   1) DM > His A1C in December 2022 was 7.0%. Pt to continue the Trulicity 1.4VR weekly, Glipizide 10mg BID, Metformin XR 1000mg BID, Actos 30mg HS and Farxiga 5mg daily. Pt to continue the Phentermine 18.75mg with dinner. Pt to check his BGs twice per day. 2) HTN > His BP today was 99/63. He is on an ARB for renal protection. I will not make any changes to his HTN regimen. 3) HLD > His lipid panel in December 2022 was at goal. Pt is tolerating his Simvastatin 20mg well. 4) Hypovitaminosis D > His Vitamin D level in December was 24.4. Pt to start Vitamin D 1000 units per day. 5) B-12 deficiency > His Vitamin B-12 in December 2022 was 454. Pt to continue the B-12 1000mcg daily. RTC 6 months    Pt voices understanding and agreement with the plan. Pain noted and pt was recommended to call his PCP for further evaluation and treatment, as needed      Follow-up and Dispositions    Return in about 6 months (around 7/18/2023).      Copy sent to:  Dr. Palomo Lange

## 2023-01-18 NOTE — LETTER
1/18/2023    Patient: Humberto Castellon   YOB: 1955   Date of Visit: 1/18/2023     Camille Garduno MD  [facility] [place] [address].  Via In [facility] [address]    Dear Camille Garduno MD,      Thank you for referring Mr. Delio Dorsey to [address] [place] [address] for evaluation. My notes for this consultation are attached. If you have questions, please do not hesitate to call me. I look forward to following your patient along with you.       Sincerely,    Mimi Price MD

## 2023-04-24 RX ORDER — DULAGLUTIDE 3 MG/.5ML
INJECTION, SOLUTION SUBCUTANEOUS
Qty: 6 EACH | Refills: 3 | Status: SHIPPED | OUTPATIENT
Start: 2023-04-24

## 2023-07-18 ENCOUNTER — OFFICE VISIT (OUTPATIENT)
Age: 68
End: 2023-07-18
Payer: MEDICARE

## 2023-07-18 VITALS
WEIGHT: 198.2 LBS | DIASTOLIC BLOOD PRESSURE: 77 MMHG | HEART RATE: 97 BPM | HEIGHT: 66 IN | BODY MASS INDEX: 31.85 KG/M2 | SYSTOLIC BLOOD PRESSURE: 119 MMHG

## 2023-07-18 DIAGNOSIS — E11.21 TYPE 2 DIABETES MELLITUS WITH DIABETIC NEPHROPATHY, WITHOUT LONG-TERM CURRENT USE OF INSULIN (HCC): Primary | ICD-10-CM

## 2023-07-18 DIAGNOSIS — I10 ESSENTIAL (PRIMARY) HYPERTENSION: ICD-10-CM

## 2023-07-18 DIAGNOSIS — E55.9 VITAMIN D DEFICIENCY, UNSPECIFIED: ICD-10-CM

## 2023-07-18 DIAGNOSIS — E78.2 MIXED HYPERLIPIDEMIA: ICD-10-CM

## 2023-07-18 DIAGNOSIS — E53.8 LOW SERUM VITAMIN B12: ICD-10-CM

## 2023-07-18 LAB — HBA1C MFR BLD: 6.7 %

## 2023-07-18 PROCEDURE — 3078F DIAST BP <80 MM HG: CPT | Performed by: INTERNAL MEDICINE

## 2023-07-18 PROCEDURE — 83036 HEMOGLOBIN GLYCOSYLATED A1C: CPT | Performed by: INTERNAL MEDICINE

## 2023-07-18 PROCEDURE — 99214 OFFICE O/P EST MOD 30 MIN: CPT | Performed by: INTERNAL MEDICINE

## 2023-07-18 PROCEDURE — 1123F ACP DISCUSS/DSCN MKR DOCD: CPT | Performed by: INTERNAL MEDICINE

## 2023-07-18 PROCEDURE — 3074F SYST BP LT 130 MM HG: CPT | Performed by: INTERNAL MEDICINE

## 2023-07-18 RX ORDER — PHENTERMINE HYDROCHLORIDE 37.5 MG/1
TABLET ORAL
Qty: 100 TABLET | Refills: 3 | Status: SHIPPED | OUTPATIENT
Start: 2023-07-18 | End: 2024-07-18

## 2023-07-18 NOTE — PROGRESS NOTES
Chief Complaint   Patient presents with    Diabetes     PCP and Pharmacy verified     History of Present Illness: Ryan Cook is a 69 y.o. male here for follow up of diabetes. He was diagnosed with diabetes around 2005. At our last visit in January 2023 his A1C was 7.0%, pt was instructed to continue the Trulicity 9.8LM weekly, Glipizide 10mg BID, Metformin XR 1000mg BID, Actos 30mg HS and Farxiga 5mg daily. Pt to continue the Phentermine 18.75mg with dinner. Pt denies any recent illnesses, injuries or hospitalizations. He is currently taking Metformin XR 1000mg BID, Actos 30mg HS, Glipizide 10mg BID, Farxiga 5mg daily and Trulicity 9.2DK weekly. He is not testing his BG regularly. He denies issues of hypoglycemia. His A1C today was 6.7%. He is still taking Lyrica 75mg twice per day, in the morning. He notes that has been helping with his leg pain. He is not checking his BGs regularly. He denies issues of hypoglycemia. He has been taking his phentermine 1/2 pill 30 minutes before dinner. He notes with the 1/2 pill before dinner he is not as hungry in the evening and he notes his appetite the next morning is also suppressed. - Pt is waking around 5AM.  - He has breakfast around 10AM, today he had a ham and egg sandwich and coffee  He will occasionally have a snack in the mid-morning. He has lunch around 1PM, today he had a ham and turkey sandwich, chips and water. He had a banana around 2PM     He has dinner around 5-6PM, last night he had two tacos and water. No history of vascular disease. No history of retinopathy or neuropathy, but does have nephropathy (history of positive microalbuminuria and CKD). Last eye exam was May 2023. \"He said my eyes look good. \"  He is taking drops for his eye pressure. Pt denies any issues of numbness or burning in his feet. Pt saw Dr. Elva Olvera of GI, he had EGD and colonoscopy and gastric emptying stomach.  He was found

## 2023-08-28 RX ORDER — SIMVASTATIN 20 MG
TABLET ORAL
Qty: 90 TABLET | Refills: 3 | Status: SHIPPED | OUTPATIENT
Start: 2023-08-28

## 2023-10-16 RX ORDER — METFORMIN HYDROCHLORIDE 500 MG/1
TABLET, EXTENDED RELEASE ORAL
Qty: 360 TABLET | Refills: 3 | Status: SHIPPED | OUTPATIENT
Start: 2023-10-16

## 2023-10-16 RX ORDER — PIOGLITAZONEHYDROCHLORIDE 30 MG/1
TABLET ORAL
Qty: 90 TABLET | Refills: 3 | Status: SHIPPED | OUTPATIENT
Start: 2023-10-16

## 2023-10-18 LAB
HBA1C MFR BLD HPLC: 7.1 %
LDL CHOLESTEROL, EXTERNAL: 67
MICROALBUMIN/CREATININE RATIO, EXTERNAL: 130

## 2023-12-27 LAB — HBA1C MFR BLD: 7 % (ref 4.8–5.6)

## 2023-12-28 LAB
25(OH)D3+25(OH)D2 SERPL-MCNC: 41.4 NG/ML (ref 30–100)
ALBUMIN SERPL-MCNC: 4.3 G/DL (ref 3.9–4.9)
ALBUMIN/CREAT UR: 164 MG/G CREAT (ref 0–29)
ALBUMIN/GLOB SERPL: 1.7 {RATIO} (ref 1.2–2.2)
ALP SERPL-CCNC: 113 IU/L (ref 44–121)
ALT SERPL-CCNC: 22 IU/L (ref 0–44)
AST SERPL-CCNC: 23 IU/L (ref 0–40)
BILIRUB SERPL-MCNC: 0.3 MG/DL (ref 0–1.2)
BUN SERPL-MCNC: 15 MG/DL (ref 8–27)
BUN/CREAT SERPL: 14 (ref 10–24)
CALCIUM SERPL-MCNC: 9.4 MG/DL (ref 8.6–10.2)
CHLORIDE SERPL-SCNC: 102 MMOL/L (ref 96–106)
CHOLEST SERPL-MCNC: 160 MG/DL (ref 100–199)
CO2 SERPL-SCNC: 24 MMOL/L (ref 20–29)
CREAT SERPL-MCNC: 1.08 MG/DL (ref 0.76–1.27)
CREAT UR-MCNC: 74.2 MG/DL
EGFRCR SERPLBLD CKD-EPI 2021: 74 ML/MIN/1.73
GLOBULIN SER CALC-MCNC: 2.6 G/DL (ref 1.5–4.5)
GLUCOSE SERPL-MCNC: 95 MG/DL (ref 70–99)
HDLC SERPL-MCNC: 61 MG/DL
IMP & REVIEW OF LAB RESULTS: NORMAL
LDLC SERPL CALC-MCNC: 77 MG/DL (ref 0–99)
Lab: NORMAL
MICROALBUMIN UR-MCNC: 122 UG/ML
POTASSIUM SERPL-SCNC: 4.3 MMOL/L (ref 3.5–5.2)
PROT SERPL-MCNC: 6.9 G/DL (ref 6–8.5)
SODIUM SERPL-SCNC: 140 MMOL/L (ref 134–144)
TRIGL SERPL-MCNC: 126 MG/DL (ref 0–149)
VIT B12 SERPL-MCNC: 615 PG/ML (ref 232–1245)
VLDLC SERPL CALC-MCNC: 22 MG/DL (ref 5–40)

## 2024-01-01 DIAGNOSIS — E55.9 VITAMIN D DEFICIENCY, UNSPECIFIED: ICD-10-CM

## 2024-01-01 DIAGNOSIS — I10 ESSENTIAL (PRIMARY) HYPERTENSION: ICD-10-CM

## 2024-01-01 DIAGNOSIS — E78.2 MIXED HYPERLIPIDEMIA: ICD-10-CM

## 2024-01-01 DIAGNOSIS — E53.8 LOW SERUM VITAMIN B12: ICD-10-CM

## 2024-01-01 DIAGNOSIS — E11.21 TYPE 2 DIABETES MELLITUS WITH DIABETIC NEPHROPATHY, WITHOUT LONG-TERM CURRENT USE OF INSULIN (HCC): ICD-10-CM

## 2024-01-18 ENCOUNTER — OFFICE VISIT (OUTPATIENT)
Age: 69
End: 2024-01-18

## 2024-01-18 VITALS
HEIGHT: 66 IN | HEART RATE: 85 BPM | BODY MASS INDEX: 33.23 KG/M2 | WEIGHT: 206.8 LBS | DIASTOLIC BLOOD PRESSURE: 69 MMHG | SYSTOLIC BLOOD PRESSURE: 99 MMHG

## 2024-01-18 DIAGNOSIS — E78.2 MIXED HYPERLIPIDEMIA: ICD-10-CM

## 2024-01-18 DIAGNOSIS — E53.8 LOW SERUM VITAMIN B12: ICD-10-CM

## 2024-01-18 DIAGNOSIS — E55.9 VITAMIN D DEFICIENCY, UNSPECIFIED: ICD-10-CM

## 2024-01-18 DIAGNOSIS — E11.21 TYPE 2 DIABETES MELLITUS WITH DIABETIC NEPHROPATHY, WITHOUT LONG-TERM CURRENT USE OF INSULIN (HCC): Primary | ICD-10-CM

## 2024-01-18 DIAGNOSIS — I10 ESSENTIAL (PRIMARY) HYPERTENSION: ICD-10-CM

## 2024-01-18 RX ORDER — PHENTERMINE HYDROCHLORIDE 37.5 MG/1
TABLET ORAL
Qty: 100 TABLET | Refills: 3 | Status: SHIPPED | OUTPATIENT
Start: 2024-01-18 | End: 2025-01-18

## 2024-01-18 NOTE — PATIENT INSTRUCTIONS
Component      Latest Ref Rng 12/27/2023   Glucose, Random      70 - 99 mg/dL 95    BUN,BUNPL      8 - 27 mg/dL 15    Creatinine      0.76 - 1.27 mg/dL 1.08    Est, Glomerular Filtration Rate      >59 mL/min/1.73 74    BUN/Creatinine Ratio      10 - 24  14    Sodium      134 - 144 mmol/L 140    Potassium      3.5 - 5.2 mmol/L 4.3    Chloride      96 - 106 mmol/L 102    CO2      20 - 29 mmol/L 24    CALCIUM, SERUM, 189149      8.6 - 10.2 mg/dL 9.4    Total Protein      6.0 - 8.5 g/dL 6.9    Albumin      3.9 - 4.9 g/dL 4.3    Globulin, Total      1.5 - 4.5 g/dL 2.6    Albumin/Globulin Ratio      1.2 - 2.2  1.7    BILIRUBIN TOTAL      0.0 - 1.2 mg/dL 0.3    Alk Phos      44 - 121 IU/L 113    AST      0 - 40 IU/L 23    ALT      0 - 44 IU/L 22    Cholesterol, Total      100 - 199 mg/dL 160    Triglycerides      0 - 149 mg/dL 126    HDL Cholesterol      >39 mg/dL 61    VLDL Cholesterol Calculated      5 - 40 mg/dL 22    LDL Calculated      0 - 99 mg/dL 77    Creatinine, Ur      Not Estab. mg/dL 74.2    Albumin, U      Not Estab. ug/mL 122.0    Microalb/Creat Ratio POC      0 - 29 mg/g creat 164 (H)    Hemoglobin A1C      4.8 - 5.6 % 7.0 (H)    Vit D, 25-Hydroxy      30.0 - 100.0 ng/mL 41.4    Vitamin B-12      232 - 1245 pg/mL 615       Legend:  (H) High

## 2024-01-18 NOTE — PROGRESS NOTES
Chief Complaint   Patient presents with    Diabetes     Pcp and pharmacy verified    Weight Management     History of Present Illness: Mariusz Neff is a 68 y.o. male here for follow up of diabetes.  He was diagnosed with diabetes around 2005.    Pt denies any recent illnesses, injuries or hospitalizations.    He is currently taking Metformin XR 1000mg BID, Actos 30mg HS, Glipizide 10mg BID, Farxiga 5mg daily and Trulicity 3.0mg weekly.    He is not testing his BG regularly.  He denies issues of hypoglycemia.    His A1C in December 2023 was 7.0%    He is still taking Lyrica 75mg twice per day, in the morning. He notes that has been helping with his leg pain.    He is not checking his BGs regularly.  He denies issues of hypoglycemia.    He has been taking his phentermine 1/2 pill 30 minutes before dinner.  He notes with the 1/2 pill before dinner he is not as hungry in the evening and he notes his appetite the next morning is also suppressed.    - Pt is waking around 5AM.  - He has breakfast around 10AM, yesterday he had a sausage and egg sandwich and coffee  - He will occasionally have a snack in the mid-morning.  - He has lunch around Noon, today he had two pork chops, a slice of bread and water.  \"If I breakfast I won't eat lunch, if I don't eat breakfast, I will eat lunch.\"    - He has dinner around 5-6PM, last night he had vegetable soup and water.            No history of vascular disease.     No history of retinopathy or neuropathy, but does have nephropathy (history of positive microalbuminuria and CKD).     Last eye exam was May 2023. \"He said my eyes look good.\"  He is taking drops for his eye pressure.    Pt denies any issues of numbness or burning in his feet.    Pt saw Dr. Bender of GI, he had EGD and colonoscopy and gastric emptying stomach. He was found to have slow emptying of his stomach (gastroparesis) and a hiatal hernia.   \"I need to make an appointment to see Dr. Bender\".    Current

## 2024-01-19 RX ORDER — GLIPIZIDE 10 MG/1
TABLET ORAL
Qty: 180 TABLET | Refills: 3 | Status: SHIPPED | OUTPATIENT
Start: 2024-01-19

## 2024-02-09 DIAGNOSIS — E11.21 TYPE 2 DIABETES MELLITUS WITH DIABETIC NEPHROPATHY, WITHOUT LONG-TERM CURRENT USE OF INSULIN (HCC): ICD-10-CM

## 2024-02-09 RX ORDER — PHENTERMINE HYDROCHLORIDE 37.5 MG/1
TABLET ORAL
Qty: 100 TABLET | Refills: 3 | Status: SHIPPED | OUTPATIENT
Start: 2024-02-09 | End: 2025-02-09

## 2024-02-19 DIAGNOSIS — E11.21 TYPE 2 DIABETES MELLITUS WITH DIABETIC NEPHROPATHY, WITHOUT LONG-TERM CURRENT USE OF INSULIN (HCC): ICD-10-CM

## 2024-02-19 RX ORDER — PHENTERMINE HYDROCHLORIDE 37.5 MG/1
TABLET ORAL
Qty: 100 TABLET | Refills: 3 | Status: SHIPPED | OUTPATIENT
Start: 2024-02-19 | End: 2025-02-19

## 2024-02-19 RX ORDER — PHENTERMINE HYDROCHLORIDE 37.5 MG/1
TABLET ORAL
Qty: 100 TABLET | Refills: 3 | Status: SHIPPED | OUTPATIENT
Start: 2024-02-19 | End: 2024-02-19 | Stop reason: SDUPTHER

## 2024-02-19 NOTE — TELEPHONE ENCOUNTER
Patient wants the Phentermine Rx to go to [facility]. Not [facility].   Called [facility] and advised to delete prescription for Phentermine.

## 2024-03-07 DIAGNOSIS — E11.21 TYPE 2 DIABETES MELLITUS WITH DIABETIC NEPHROPATHY, WITHOUT LONG-TERM CURRENT USE OF INSULIN (HCC): ICD-10-CM

## 2024-03-07 RX ORDER — PHENTERMINE HYDROCHLORIDE 37.5 MG/1
TABLET ORAL
Qty: 100 TABLET | Refills: 1 | Status: SHIPPED | OUTPATIENT
Start: 2024-03-07 | End: 2025-03-08

## 2024-03-07 NOTE — TELEPHONE ENCOUNTER
Phentermine was sent to the wrong [facility]. They will not transfer the Rx. Should have gone to [facility] on [address]

## 2024-03-27 RX ORDER — DULAGLUTIDE 3 MG/.5ML
INJECTION, SOLUTION SUBCUTANEOUS
Qty: 6 ML | Refills: 3 | Status: SHIPPED | OUTPATIENT
Start: 2024-03-27

## 2024-04-23 ENCOUNTER — ANESTHESIA (OUTPATIENT)
Facility: HOSPITAL | Age: 69
End: 2024-04-23
Payer: MEDICARE

## 2024-04-23 ENCOUNTER — ANESTHESIA EVENT (OUTPATIENT)
Facility: HOSPITAL | Age: 69
End: 2024-04-23
Payer: MEDICARE

## 2024-04-23 ENCOUNTER — HOSPITAL ENCOUNTER (OUTPATIENT)
Facility: HOSPITAL | Age: 69
Setting detail: OUTPATIENT SURGERY
Discharge: HOME | End: 2024-04-23
Attending: SPECIALIST | Admitting: SPECIALIST
Payer: MEDICARE

## 2024-04-23 VITALS
DIASTOLIC BLOOD PRESSURE: 85 MMHG | RESPIRATION RATE: 15 BRPM | HEIGHT: 67 IN | BODY MASS INDEX: 31.39 KG/M2 | WEIGHT: 200 LBS | TEMPERATURE: 97.1 F | OXYGEN SATURATION: 97 % | SYSTOLIC BLOOD PRESSURE: 127 MMHG | HEART RATE: 79 BPM

## 2024-04-23 PROCEDURE — 2580000003 HC RX 258: Performed by: SPECIALIST

## 2024-04-23 PROCEDURE — 2709999900 HC NON-CHARGEABLE SUPPLY: Performed by: SPECIALIST

## 2024-04-23 PROCEDURE — 3700000000 HC ANESTHESIA ATTENDED CARE: Performed by: SPECIALIST

## 2024-04-23 PROCEDURE — 88305 TISSUE EXAM BY PATHOLOGIST: CPT

## 2024-04-23 PROCEDURE — 3700000001 HC ADD 15 MINUTES (ANESTHESIA): Performed by: SPECIALIST

## 2024-04-23 PROCEDURE — C1889 IMPLANT/INSERT DEVICE, NOC: HCPCS | Performed by: SPECIALIST

## 2024-04-23 PROCEDURE — 7100000010 HC PHASE II RECOVERY - FIRST 15 MIN: Performed by: SPECIALIST

## 2024-04-23 PROCEDURE — 6360000002 HC RX W HCPCS

## 2024-04-23 PROCEDURE — 3600007512: Performed by: SPECIALIST

## 2024-04-23 PROCEDURE — 7100000011 HC PHASE II RECOVERY - ADDTL 15 MIN: Performed by: SPECIALIST

## 2024-04-23 PROCEDURE — 2500000003 HC RX 250 WO HCPCS

## 2024-04-23 PROCEDURE — 3600007502: Performed by: SPECIALIST

## 2024-04-23 DEVICE — CLIP
Type: IMPLANTABLE DEVICE | Site: OTHER | Status: FUNCTIONAL
Brand: RESOLUTION 360™ ULTRA CLIP

## 2024-04-23 DEVICE — WORKING LENGTH 235CM, WORKING CHANNEL 2.8MM
Type: IMPLANTABLE DEVICE | Site: OTHER | Status: FUNCTIONAL
Brand: RESOLUTION 360 CLIP

## 2024-04-23 RX ORDER — LIDOCAINE HYDROCHLORIDE 20 MG/ML
INJECTION, SOLUTION EPIDURAL; INFILTRATION; INTRACAUDAL; PERINEURAL PRN
Status: DISCONTINUED | OUTPATIENT
Start: 2024-04-23 | End: 2024-04-23 | Stop reason: SDUPTHER

## 2024-04-23 RX ORDER — SODIUM CHLORIDE 0.9 % (FLUSH) 0.9 %
5-40 SYRINGE (ML) INJECTION PRN
Status: DISCONTINUED | OUTPATIENT
Start: 2024-04-23 | End: 2024-04-23 | Stop reason: HOSPADM

## 2024-04-23 RX ORDER — SODIUM CHLORIDE 9 MG/ML
25 INJECTION, SOLUTION INTRAVENOUS PRN
Status: DISCONTINUED | OUTPATIENT
Start: 2024-04-23 | End: 2024-04-23 | Stop reason: HOSPADM

## 2024-04-23 RX ORDER — SODIUM CHLORIDE 0.9 % (FLUSH) 0.9 %
5-40 SYRINGE (ML) INJECTION EVERY 12 HOURS SCHEDULED
Status: DISCONTINUED | OUTPATIENT
Start: 2024-04-23 | End: 2024-04-23 | Stop reason: HOSPADM

## 2024-04-23 RX ADMIN — PROPOFOL 260 MG: 10 INJECTION, EMULSION INTRAVENOUS at 09:09

## 2024-04-23 RX ADMIN — LIDOCAINE HYDROCHLORIDE 100 MG: 20 INJECTION, SOLUTION EPIDURAL; INFILTRATION; INTRACAUDAL; PERINEURAL at 08:35

## 2024-04-23 RX ADMIN — SODIUM CHLORIDE 25 ML: 9 INJECTION, SOLUTION INTRAVENOUS at 08:07

## 2024-04-23 RX ADMIN — PROPOFOL 100 MG: 10 INJECTION, EMULSION INTRAVENOUS at 08:35

## 2024-04-23 ASSESSMENT — PAIN - FUNCTIONAL ASSESSMENT: PAIN_FUNCTIONAL_ASSESSMENT: NONE - DENIES PAIN

## 2024-04-23 NOTE — OP NOTE
Colonoscopy Procedure Note    Indications:   Personal history of colon polyps (screening only)    Referring Physician: Amelia Villanueva MD  Anesthesia/Sedation: MAC anesthesia Propofol  Endoscopist:  Dr. Chito Bender    Procedure in Detail:  Informed consent was obtained for the procedure, including sedation.  Risks of perforation, hemorrhage, adverse drug reaction, and aspiration were discussed. The patient was placed in the left lateral decubitus position.  Based on the pre-procedure assessment, including review of the patient's medical history, medications, allergies, and review of systems, he had been deemed to be an appropriate candidate for moderate sedation; he was therefore sedated with the medications listed above.   The patient was monitored continuously with ECG tracing, pulse oximetry, blood pressure monitoring, and direct observations.      A rectal examination was performed. The [id number] was inserted into the rectum and advanced under direct vision to the cecum, which was identified by the ileocecal valve and appendiceal orifice.  The quality of the colonic preparation was adequate.  A careful inspection was made as the colonoscope was withdrawn, including a retroflexed view of the rectum; findings and interventions are described below.  Appropriate photodocumentation was obtained.    Findings:    Scope advanced to the cecum.   (1) pedunculated polyp 8 mm with some friability on stalk removed w hot snare in area of proximal transverse (2) clips placed for oozing post polypectomy   (1) sessile polyp 7 mm on fold in transverse colon s/p hot snare removal   (1) pedunculated polyp 8 mm in area of splenic flexure s/p hot snare removal   Diffuse diverticulosis in sigmoid/descending colon.    Therapies:  see above    Specimen: Specimens were collected as described above and sent to pathology.     Complications: None were encountered during the procedure.     EBL: < 10 ml.    Recommendations:     -

## 2024-04-23 NOTE — PROGRESS NOTES
The risks and benefits of the bite block have been explained to patient.  Patient verbalizes understanding.      ARRIVAL INFORMATION:  Verified patient name and date of birth, scheduled procedure, and informed consent.     : [facility] contact number: [phone]  Physician and staff can share information with the .     Belongings with patient include:  Clothing,Glasses    GI FOCUSED ASSESSMENT:  Neuro: Awake, alert, oriented x4  Respiratory: even and unlabored   GI: soft and non-distended  EKG Rhythm: normal sinus rhythm    Education:Reviewed general discharge instructions and  information.

## 2024-04-23 NOTE — OP NOTE
Esophagogastroduodenoscopy Procedure Note      Mariusz Neff  1955  [id number]    Indication:  GERD, H/O chronic gastritis      Endoscopist: Chito Bender MD    Referring Provider:  Amelia Villanueva MD    Sedation:  MAC anesthesia Propofol    Procedure Details:  After infomed consent was obtained for the procedure, with all risks and benefits of procedure explained the patient was taken to the endoscopy suite and placed in the left lateral decubitus position.  Following sequential administration of sedation as per above, the endoscope was inserted into the mouth and advanced under direct vision to second portion of the duodenum.  A careful inspection was made as the gastroscope was withdrawn, including a retroflexed view of the proximal stomach; findings and interventions are described below.      Findings:     Esophagus:   + Normal esophageal mucosa throughout.  + Z line normal at 40 cm from incisors.  + Small hiatal hernia with diaphragmatic pinch at 42 cm from incisors  Stomach:   + Stomach with diffuse raised folds in the body c/w gastritis s/p Bxs.  No ulcers or erosions.    Duodenum:   - The bulb and post bulbar mucosa is normal in appearance to the second portion. The duodenal folds appeared normal.     Therapies:  see above    Specimen: Specimens were collected as described and send to the laboratory.           Complications:   None were encountered during the procedure.    EBL: < 10 ml.          Recommendations:   -F/U Path to determine if further EGD required for presence of gastric intestinal metaplasia    Chito Bender MD  4/23/2024  9:14 AM

## 2024-04-23 NOTE — ANESTHESIA PRE PROCEDURE
Department of Anesthesiology  Preprocedure Note       Name:  Mariusz Neff   Age:  68 y.o.  :  1955                                          MRN:  [id number]         Date:  2024      Surgeon: Surgeon(s):  Chito Bender MD    Procedure: Procedure(s):  ESOPHAGOGASTRODUODENOSCOPY DIAGNOSTIC ONLY  COLONOSCOPY DIAGNOSTIC    Medications prior to admission:   Prior to Admission medications    Medication Sig Start Date End Date Taking? Authorizing Provider   TRULICITY 3 MG/0.5ML SOPN INJECT 3 MG UNDER THE SKIN EVERY 7 DAYS  Patient taking differently: nydia 3/27/24   Rodrigo Shankar MD   phentermine (ADIPEX-P) 37.5 MG tablet Take 1/2 a pill with dinner 3/7/24 3/8/25  Rodrigo Shankar MD   glipiZIDE (GLUCOTROL) 10 MG tablet TAKE 1 TABLET TWICE A DAY 24   Rodrigo Shankar MD   dapagliflozin (FARXIGA) 10 MG tablet Take 1 tablet by mouth daily 24   Rodrigo Shankar MD   pioglitazone (ACTOS) 30 MG tablet TAKE 1 TABLET DAILY FOR DIABETES 10/16/23   Rodrigo Shankar MD   metFORMIN (GLUCOPHAGE-XR) 500 MG extended release tablet TAKE 2 TABLETS TWICE A DAY FOR DIABETES 10/16/23   Rodrigo Shankar MD   simvastatin (ZOCOR) 20 MG tablet TAKE 1 TABLET NIGHTLY FOR CHOLESTEROL 23   Rodrigo Shankar MD   Apple Cider Vinegar 600 MG CAPS Take 1 tablet by mouth daily    Automatic Reconciliation, Ar   aspirin 81 MG EC tablet Take by mouth daily    Automatic Reconciliation, Ar   Cinnamon 500 MG CAPS Take by mouth 2 times daily    Automatic Reconciliation, Ar   fexofenadine (ALLEGRA) 180 MG tablet Take 1 tablet by mouth daily    Automatic Reconciliation, Ar   lansoprazole (PREVACID) 30 MG delayed release capsule Take 1 capsule by mouth daily 11/15/17   Automatic Reconciliation, Ar   mometasone (ELOCON) 0.1 % cream as needed 16   Automatic Reconciliation, Ar   olmesartan (BENICAR) 20 MG tablet Take 1 tablet by mouth daily 23   Automatic Reconciliation, Ar   pregabalin (LYRICA) 75 MG

## 2024-04-23 NOTE — H&P
Gastroenterology Outpatient History and Physical    Patient: Mariusz SANTOS Tawanda    Physician: Chito Bender MD    Vital Signs: Blood pressure (!) 152/78, pulse 80, resp. rate 22, height 1.702 m (5' 7\"), weight 90.7 kg (200 lb), SpO2 98 %.    Allergies:   Allergies   Allergen Reactions    Diclofenac Itching    Empagliflozin Other (See Comments)     Polyuria    Gabapentin Hives    Penicillins Hives       Chief Complaint: Chronic gastritis, GERD for EGD;  Colonoscopy for h/o adenomatous colon polyps    History of Present Illness: above    Justification for Procedure: above    History:  Past Medical History:   Diagnosis Date    Diabetes (HCC)     GERD (gastroesophageal reflux disease)     High cholesterol     Hypertension     BRENDA on CPAP       Past Surgical History:   Procedure Laterality Date    COLONOSCOPY N/A 5/22/2020    COLONOSCOPY performed by Chito Bender MD at [facility] ENDOSCOPY    COLONOSCOPY      UPPER GASTROINTESTINAL ENDOSCOPY        Social History     Socioeconomic History    Marital status:      Spouse name: None    Number of children: None    Years of education: None    Highest education level: None   Tobacco Use    Smoking status: Never    Smokeless tobacco: Never   Substance and Sexual Activity    Alcohol use: Yes    Drug use: No      Family History   Problem Relation Age of Onset    Diabetes Mother     Stroke Father     No Known Problems Brother     No Known Problems Brother     No Known Problems Brother     No Known Problems Brother     No Known Problems Brother     Diabetes Maternal Grandmother     Thyroid Disease Mother     Diabetes Sister     No Known Problems Sister        Medications:   Prior to Admission medications    Medication Sig Start Date End Date Taking? Authorizing Provider   TRULICITY 3 MG/0.5ML SOPN INJECT 3 MG UNDER THE SKIN EVERY 7 DAYS  Patient taking differently: nydia 3/27/24   Rodrigo Shankar MD   phentermine (ADIPEX-P) 37.5 MG tablet Take 1/2 a pill with dinner

## 2024-04-23 NOTE — ANESTHESIA POSTPROCEDURE EVALUATION
Department of Anesthesiology  Postprocedure Note    Patient: Mariusz Neff  MRN: [id number]  YOB: 1955  Date of evaluation: 4/23/2024    Procedure Summary       Date: 04/23/24 Room / Location: [facility] ENDO 02 / [facility] ENDOSCOPY    Anesthesia Start: 0829 Anesthesia Stop: 0912    Procedures:       ESOPHAGOGASTRODUODENOSCOPY DIAGNOSTIC ONLY (Upper GI Region)      COLONOSCOPY DIAGNOSTIC (Lower GI Region) Diagnosis:       Gastroesophageal reflux disease, unspecified whether esophagitis present      Colon cancer screening      (Gastroesophageal reflux disease, unspecified whether esophagitis present [K21.9])      (Colon cancer screening [Z12.11])    Surgeons: Chito Bender MD Responsible Provider: George Kingston MD    Anesthesia Type: MAC ASA Status: 2            Anesthesia Type: MAC    Juan C Phase I: Juan C Score: 10    Juan C Phase II: Juan C Score: 10    Anesthesia Post Evaluation    Patient location during evaluation: bedside  Patient participation: complete - patient participated  Level of consciousness: awake  Pain score: 0  Airway patency: patent  Nausea & Vomiting: no nausea and no vomiting  Cardiovascular status: blood pressure returned to baseline  Respiratory status: acceptable  Hydration status: euvolemic  Pain management: adequate    No notable events documented.

## 2024-04-23 NOTE — PROGRESS NOTES
Endoscopy Case End Note:     EGD: 0844: Procedure scope was pre-cleaned, per protocol, at bedside by LISA Guerra.      COLON: 0910: Procedure scope was pre-cleaned, per protocol, at bedside by LISA Guerra.       Report received from anesthesia.  See anesthesia flowsheet for intra-procedure vital signs and events.    Belongings (including glasses) remain under stretcher with patient.

## 2024-04-23 NOTE — DISCHARGE INSTRUCTIONS
Mariusz Neff  [id number]  1955    COLON / EGD DISCHARGE INSTRUCTIONS  Discomfort:  Sore throat- throat lozenges or warm salt water gargle  Redness at IV site- apply warm compress to area; if redness or soreness persist- contact your physician  There may be a slight amount of blood passed from the rectum  Gaseous discomfort- walking, belching will help relieve any discomfort  You may not operate a vehicle for 12 hours  You may not engage in an occupation involving machinery or appliances for rest of today  You may not drink alcoholic beverages for at least 12 hours  Avoid making any critical decisions for at least 24 hour  DIET:      - however -  remember your colon is empty and a heavy meal will produce gas.   Avoid these foods:  vegetables, fried / greasy foods, carbonated drinks for today     ACTIVITY:  You may  resume your normal daily activities it is recommended that you spend the remainder of the day resting -  avoid any strenuous activity.  CALL M.D.  ANY SIGN OF:   Increasing pain, nausea, vomiting  Abdominal distension (swelling)  New increased bleeding (oral or rectal)  Fever (chills)  Pain in chest area  Bloody discharge from nose or mouth  Shortness of breath    You may not  take any Advil, Aspirin, Ibuprofen, Motrin, Aleve, or Goody’s for 10 days, ONLY  Tylenol as needed for pain.      Follow-up Instructions:   Call Dr. Bender for results of procedure / biopsy in 7 days at telephone #    Additional instructions: repeat colonoscopy in 3 years    Findings:  EGD: Gastritis, small Hiatal hernia      Colonoscopy:   (3) polyps removed total      Patient Education on Sedation / Analgesia Administered for Procedure      For 24 hours after general anesthesia or intravenous analgesia / sedation:  Have someone responsible help you with your care  Limit your activities  Do not drive and operate hazardous machinery  Do not make important personal, legal or business decisions  Do not drink

## 2024-06-01 LAB
HBA1C MFR BLD HPLC: 6.8 %
MICROALBUMIN/CREATININE RATIO, EXTERNAL: 91

## 2024-07-01 DIAGNOSIS — E11.21 TYPE 2 DIABETES MELLITUS WITH DIABETIC NEPHROPATHY, WITHOUT LONG-TERM CURRENT USE OF INSULIN (HCC): ICD-10-CM

## 2024-07-01 DIAGNOSIS — E78.2 MIXED HYPERLIPIDEMIA: ICD-10-CM

## 2024-07-01 DIAGNOSIS — E55.9 VITAMIN D DEFICIENCY, UNSPECIFIED: ICD-10-CM

## 2024-07-01 DIAGNOSIS — I10 ESSENTIAL (PRIMARY) HYPERTENSION: ICD-10-CM

## 2024-07-01 DIAGNOSIS — E53.8 LOW SERUM VITAMIN B12: ICD-10-CM

## 2024-07-13 LAB
25(OH)D3+25(OH)D2 SERPL-MCNC: 54.6 NG/ML (ref 30–100)
ALBUMIN SERPL-MCNC: 4.4 G/DL (ref 3.9–4.9)
ALP SERPL-CCNC: 115 IU/L (ref 44–121)
ALT SERPL-CCNC: 20 IU/L (ref 0–44)
AST SERPL-CCNC: 18 IU/L (ref 0–40)
BILIRUB SERPL-MCNC: 0.3 MG/DL (ref 0–1.2)
BUN SERPL-MCNC: 20 MG/DL (ref 8–27)
BUN/CREAT SERPL: 15 (ref 10–24)
CALCIUM SERPL-MCNC: 9.8 MG/DL (ref 8.6–10.2)
CHLORIDE SERPL-SCNC: 103 MMOL/L (ref 96–106)
CO2 SERPL-SCNC: 25 MMOL/L (ref 20–29)
CREAT SERPL-MCNC: 1.31 MG/DL (ref 0.76–1.27)
EGFRCR SERPLBLD CKD-EPI 2021: 59 ML/MIN/1.73
ERYTHROCYTE [DISTWIDTH] IN BLOOD BY AUTOMATED COUNT: 15.8 % (ref 11.6–15.4)
GLOBULIN SER CALC-MCNC: 2.9 G/DL (ref 1.5–4.5)
GLUCOSE SERPL-MCNC: 91 MG/DL (ref 70–99)
HBA1C MFR BLD: 7 % (ref 4.8–5.6)
HCT VFR BLD AUTO: 41.8 % (ref 37.5–51)
HGB BLD-MCNC: 13.6 G/DL (ref 13–17.7)
Lab: NORMAL
MCH RBC QN AUTO: 27.4 PG (ref 26.6–33)
MCHC RBC AUTO-ENTMCNC: 32.5 G/DL (ref 31.5–35.7)
MCV RBC AUTO: 84 FL (ref 79–97)
PLATELET # BLD AUTO: 244 X10E3/UL (ref 150–450)
POTASSIUM SERPL-SCNC: 4.8 MMOL/L (ref 3.5–5.2)
PROT SERPL-MCNC: 7.3 G/DL (ref 6–8.5)
RBC # BLD AUTO: 4.96 X10E6/UL (ref 4.14–5.8)
REPORT: NORMAL
SODIUM SERPL-SCNC: 142 MMOL/L (ref 134–144)
VIT B12 SERPL-MCNC: 729 PG/ML (ref 232–1245)
WBC # BLD AUTO: 4.6 X10E3/UL (ref 3.4–10.8)

## 2024-07-23 ENCOUNTER — OFFICE VISIT (OUTPATIENT)
Age: 69
End: 2024-07-23
Payer: MEDICARE

## 2024-07-23 VITALS
SYSTOLIC BLOOD PRESSURE: 126 MMHG | HEIGHT: 67 IN | BODY MASS INDEX: 31.71 KG/M2 | HEART RATE: 98 BPM | WEIGHT: 202 LBS | DIASTOLIC BLOOD PRESSURE: 77 MMHG

## 2024-07-23 DIAGNOSIS — I10 ESSENTIAL (PRIMARY) HYPERTENSION: ICD-10-CM

## 2024-07-23 DIAGNOSIS — E11.21 TYPE 2 DIABETES MELLITUS WITH DIABETIC NEPHROPATHY, WITHOUT LONG-TERM CURRENT USE OF INSULIN (HCC): Primary | ICD-10-CM

## 2024-07-23 DIAGNOSIS — E78.2 MIXED HYPERLIPIDEMIA: ICD-10-CM

## 2024-07-23 DIAGNOSIS — E53.8 LOW SERUM VITAMIN B12: ICD-10-CM

## 2024-07-23 PROCEDURE — G2211 COMPLEX E/M VISIT ADD ON: HCPCS | Performed by: INTERNAL MEDICINE

## 2024-07-23 PROCEDURE — 1123F ACP DISCUSS/DSCN MKR DOCD: CPT | Performed by: INTERNAL MEDICINE

## 2024-07-23 PROCEDURE — 99214 OFFICE O/P EST MOD 30 MIN: CPT | Performed by: INTERNAL MEDICINE

## 2024-07-23 PROCEDURE — 3078F DIAST BP <80 MM HG: CPT | Performed by: INTERNAL MEDICINE

## 2024-07-23 PROCEDURE — 3051F HG A1C>EQUAL 7.0%<8.0%: CPT | Performed by: INTERNAL MEDICINE

## 2024-07-23 PROCEDURE — 3074F SYST BP LT 130 MM HG: CPT | Performed by: INTERNAL MEDICINE

## 2024-07-23 NOTE — PROGRESS NOTES
m/r/g, 2+ DP pulses   Respiratory: clear bilaterally  Integumentary: no edema, no foot ulcers,   Psychiatric: normal mood and affect    Diabetic foot exam:   Left Foot:   Visual Exam: callous- present   Pulse DP: 2+ (normal)   Filament test: normal sensation   Vibratory Sensation: normal  Right Foot:   Visual Exam: callous- present   Pulse DP: 2+ (normal)   Filament test: normal sensation   Vibratory Sensation: normal      Data Reviewed:   Component      Latest Ref Rng 7/12/2024   Glucose      70 - 99 mg/dL 91    BUN,BUNPL      8 - 27 mg/dL 20    Creatinine      0.76 - 1.27 mg/dL 1.31 (H)    Est, Glom Filt Rate      >59 mL/min/1.73 59 (L)    Bun/Cre      10 - 24  15    Sodium      134 - 144 mmol/L 142    Potassium      3.5 - 5.2 mmol/L 4.8    Chloride      96 - 106 mmol/L 103    CARBON DIOXIDE      20 - 29 mmol/L 25    Calcium      8.6 - 10.2 mg/dL 9.8    Total Protein      6.0 - 8.5 g/dL 7.3    Albumin      3.9 - 4.9 g/dL 4.4    Globulin, Total      1.5 - 4.5 g/dL 2.9    Total Bilirubin      0.0 - 1.2 mg/dL 0.3    Alkaline Phosphatase      44 - 121 IU/L 115    AST      0 - 40 IU/L 18    ALT      0 - 44 IU/L 20    WBC      3.4 - 10.8 x10E3/uL 4.6    RBC      4.14 - 5.80 x10E6/uL 4.96    Hemoglobin Quant      13.0 - 17.7 g/dL 13.6    Hematocrit      37.5 - 51.0 % 41.8    MCV      79 - 97 fL 84    MCH      26.6 - 33.0 pg 27.4    MCHC      31.5 - 35.7 g/dL 32.5    RDW      11.6 - 15.4 % 15.8 (H)    Platelet Count      150 - 450 x10E3/uL 244    Hemoglobin A1C      4.8 - 5.6 % 7.0 (H)    Vit D, 25-Hydroxy      30.0 - 100.0 ng/mL 54.6    Vitamin B-12      232 - 1245 pg/mL 729       Assessment/Plan:   1) DM > His A1C last week was 7.0%. Pt to continue the Trulicity 3.0mg weekly, Glipizide 10mg BID, Metformin XR 1000mg BID, Actos 30mg HS and Farxiga 10mg daily. Pt to continue the Phentermine 18.75mg with dinner.   Pt to check his BGs twice per day.  With his hx of neuropathy and calluses he would benefit from DM shoes and

## 2024-08-26 RX ORDER — SIMVASTATIN 20 MG
TABLET ORAL
Qty: 90 TABLET | Refills: 1 | Status: SHIPPED | OUTPATIENT
Start: 2024-08-26

## 2024-09-19 DIAGNOSIS — E11.21 TYPE 2 DIABETES MELLITUS WITH DIABETIC NEPHROPATHY, WITHOUT LONG-TERM CURRENT USE OF INSULIN (HCC): ICD-10-CM

## 2024-09-20 RX ORDER — PHENTERMINE HYDROCHLORIDE 37.5 MG/1
TABLET ORAL
Qty: 100 TABLET | Refills: 1 | Status: SHIPPED | OUTPATIENT
Start: 2024-09-20 | End: 2025-09-20

## 2024-10-25 RX ORDER — METFORMIN HYDROCHLORIDE 500 MG/1
TABLET, EXTENDED RELEASE ORAL
Qty: 360 TABLET | Refills: 1 | Status: SHIPPED | OUTPATIENT
Start: 2024-10-25

## 2024-11-08 RX ORDER — PIOGLITAZONEHYDROCHLORIDE 30 MG/1
TABLET ORAL
Qty: 90 TABLET | Refills: 1 | Status: SHIPPED | OUTPATIENT
Start: 2024-11-08

## 2024-11-15 RX ORDER — PIOGLITAZONEHYDROCHLORIDE 30 MG/1
30 TABLET ORAL DAILY
Qty: 90 TABLET | Refills: 1 | Status: SHIPPED | OUTPATIENT
Start: 2024-11-15

## 2025-01-16 ENCOUNTER — TELEPHONE (OUTPATIENT)
Age: 70
End: 2025-01-16

## 2025-01-16 DIAGNOSIS — E55.9 VITAMIN D DEFICIENCY, UNSPECIFIED: ICD-10-CM

## 2025-01-16 DIAGNOSIS — E53.8 LOW SERUM VITAMIN B12: ICD-10-CM

## 2025-01-16 DIAGNOSIS — I10 ESSENTIAL (PRIMARY) HYPERTENSION: ICD-10-CM

## 2025-01-16 DIAGNOSIS — E78.2 MIXED HYPERLIPIDEMIA: ICD-10-CM

## 2025-01-16 DIAGNOSIS — E11.21 TYPE 2 DIABETES MELLITUS WITH DIABETIC NEPHROPATHY, WITHOUT LONG-TERM CURRENT USE OF INSULIN (HCC): Primary | ICD-10-CM

## 2025-01-16 NOTE — TELEPHONE ENCOUNTER
Patient asked if labs have been ordered for his upcoming appointment next week.  Please order labs. Patient was advised to go fasting tomorrow am to the lab. He agreed.

## 2025-01-19 LAB
25(OH)D3+25(OH)D2 SERPL-MCNC: 41.4 NG/ML (ref 30–100)
ALBUMIN SERPL-MCNC: 4.1 G/DL (ref 3.8–4.8)
ALBUMIN/CREAT UR: 80 MG/G CREAT (ref 0–29)
ALP SERPL-CCNC: 104 IU/L (ref 44–121)
ALT SERPL-CCNC: 20 IU/L (ref 0–44)
AST SERPL-CCNC: 26 IU/L (ref 0–40)
BILIRUB SERPL-MCNC: 0.3 MG/DL (ref 0–1.2)
BUN SERPL-MCNC: 16 MG/DL (ref 8–27)
BUN/CREAT SERPL: 13 (ref 10–24)
CALCIUM SERPL-MCNC: 9 MG/DL (ref 8.6–10.2)
CHLORIDE SERPL-SCNC: 105 MMOL/L (ref 96–106)
CHOLEST SERPL-MCNC: 166 MG/DL (ref 100–199)
CO2 SERPL-SCNC: 25 MMOL/L (ref 20–29)
CREAT SERPL-MCNC: 1.27 MG/DL (ref 0.76–1.27)
CREAT UR-MCNC: 115.6 MG/DL
EGFRCR SERPLBLD CKD-EPI 2021: 60 ML/MIN/1.73
ERYTHROCYTE [DISTWIDTH] IN BLOOD BY AUTOMATED COUNT: 14.8 % (ref 11.6–15.4)
GLOBULIN SER CALC-MCNC: 2.4 G/DL (ref 1.5–4.5)
GLUCOSE SERPL-MCNC: 98 MG/DL (ref 70–99)
HCT VFR BLD AUTO: 39.9 % (ref 37.5–51)
HDLC SERPL-MCNC: 63 MG/DL
HGB BLD-MCNC: 13 G/DL (ref 13–17.7)
LDLC SERPL CALC-MCNC: 84 MG/DL (ref 0–99)
MCH RBC QN AUTO: 28.4 PG (ref 26.6–33)
MCHC RBC AUTO-ENTMCNC: 32.6 G/DL (ref 31.5–35.7)
MCV RBC AUTO: 87 FL (ref 79–97)
MICROALBUMIN UR-MCNC: 93 UG/ML
PLATELET # BLD AUTO: 209 X10E3/UL (ref 150–450)
POTASSIUM SERPL-SCNC: 4.5 MMOL/L (ref 3.5–5.2)
PROT SERPL-MCNC: 6.5 G/DL (ref 6–8.5)
RBC # BLD AUTO: 4.57 X10E6/UL (ref 4.14–5.8)
SODIUM SERPL-SCNC: 142 MMOL/L (ref 134–144)
TRIGL SERPL-MCNC: 109 MG/DL (ref 0–149)
VLDLC SERPL CALC-MCNC: 19 MG/DL (ref 5–40)
WBC # BLD AUTO: 3.8 X10E3/UL (ref 3.4–10.8)

## 2025-01-20 LAB
HBA1C MFR BLD: 7.1 % (ref 4.8–5.6)
IMP & REVIEW OF LAB RESULTS: NORMAL
Lab: NORMAL
VIT B12 SERPL-MCNC: 378 PG/ML (ref 232–1245)

## 2025-01-22 ENCOUNTER — OFFICE VISIT (OUTPATIENT)
Age: 70
End: 2025-01-22

## 2025-01-22 VITALS
BODY MASS INDEX: 31.48 KG/M2 | HEART RATE: 89 BPM | HEIGHT: 67 IN | WEIGHT: 200.6 LBS | DIASTOLIC BLOOD PRESSURE: 72 MMHG | SYSTOLIC BLOOD PRESSURE: 114 MMHG

## 2025-01-22 DIAGNOSIS — E53.8 LOW SERUM VITAMIN B12: ICD-10-CM

## 2025-01-22 DIAGNOSIS — E11.21 TYPE 2 DIABETES MELLITUS WITH DIABETIC NEPHROPATHY, WITHOUT LONG-TERM CURRENT USE OF INSULIN (HCC): Primary | ICD-10-CM

## 2025-01-22 DIAGNOSIS — E55.9 VITAMIN D DEFICIENCY, UNSPECIFIED: ICD-10-CM

## 2025-01-22 DIAGNOSIS — I10 ESSENTIAL (PRIMARY) HYPERTENSION: ICD-10-CM

## 2025-01-22 DIAGNOSIS — E78.2 MIXED HYPERLIPIDEMIA: ICD-10-CM

## 2025-01-22 RX ORDER — OLMESARTAN MEDOXOMIL 5 MG/1
5 TABLET ORAL DAILY
Qty: 90 TABLET | Refills: 1 | Status: SHIPPED | OUTPATIENT
Start: 2025-01-22

## 2025-01-22 NOTE — PROGRESS NOTES
neuropathy and calluses he would benefit from DM shoes and inserts.    2) HTN > His BP today was 114/72. He is on an ARB for renal protection. He notes he has had some issues of light headedness with standing. I will decrease his Olmesartan to 5mg daily.    3) HLD > His lipid panel in January 2025 was at goal. Pt is tolerating his Simvastatin 20mg well.    4) Hypovitaminosis D > His Vitamin D level in January 2025 was 41.4. Pt to continue the Vitamin D 1000 units per day.    5) B-12 deficiency > His Vitamin B-12 in January 2025 was 378. Pt to continue the B-12 1000mcg daily.    RTC 6 months    Pt voices understanding and agreement with the plan.  Pain noted and pt was recommended to call his PCP for further evaluation and treatment, as needed    Copy sent to:  Dr. Amelia Villanueva

## 2025-01-28 RX ORDER — DAPAGLIFLOZIN 10 MG/1
10 TABLET, FILM COATED ORAL DAILY
Qty: 90 TABLET | Refills: 1 | Status: SHIPPED | OUTPATIENT
Start: 2025-01-28 | End: 2025-01-28 | Stop reason: SDUPTHER

## 2025-01-28 RX ORDER — DAPAGLIFLOZIN 10 MG/1
10 TABLET, FILM COATED ORAL DAILY
Refills: 1 | OUTPATIENT
Start: 2025-01-28

## 2025-01-28 RX ORDER — GLIPIZIDE 10 MG/1
10 TABLET ORAL 2 TIMES DAILY
Qty: 180 TABLET | Refills: 1 | Status: SHIPPED | OUTPATIENT
Start: 2025-01-28

## 2025-01-28 NOTE — TELEPHONE ENCOUNTER
Per last OV: 1) DM > His A1C last week was 7.1%. Pt to continue the Trulicity 3.0mg weekly, Glipizide 10mg BID, Metformin XR 1000mg BID, Actos 30mg HS and Farxiga 10mg daily.

## 2025-01-29 RX ORDER — DAPAGLIFLOZIN 10 MG/1
10 TABLET, FILM COATED ORAL DAILY
Qty: 90 TABLET | Refills: 1 | Status: SHIPPED | OUTPATIENT
Start: 2025-01-29

## 2025-03-03 RX ORDER — SIMVASTATIN 20 MG
20 TABLET ORAL NIGHTLY
Qty: 90 TABLET | Refills: 1 | Status: SHIPPED | OUTPATIENT
Start: 2025-03-03

## 2025-03-21 DIAGNOSIS — E11.21 TYPE 2 DIABETES MELLITUS WITH DIABETIC NEPHROPATHY, WITHOUT LONG-TERM CURRENT USE OF INSULIN (HCC): ICD-10-CM

## 2025-03-24 RX ORDER — PHENTERMINE HYDROCHLORIDE 37.5 MG/1
TABLET ORAL
Qty: 100 TABLET | Refills: 0 | Status: SHIPPED | OUTPATIENT
Start: 2025-03-24 | End: 2026-03-24

## 2025-05-29 RX ORDER — OLMESARTAN MEDOXOMIL 5 MG/1
5 TABLET ORAL DAILY
Qty: 90 TABLET | Refills: 1 | Status: CANCELLED | OUTPATIENT
Start: 2025-05-29

## 2025-05-30 RX ORDER — OLMESARTAN MEDOXOMIL 5 MG/1
5 TABLET ORAL DAILY
Qty: 90 TABLET | Refills: 1 | Status: SHIPPED | OUTPATIENT
Start: 2025-05-30

## 2025-06-03 RX ORDER — OLMESARTAN MEDOXOMIL 5 MG/1
5 TABLET ORAL DAILY
Qty: 90 TABLET | Refills: 1 | Status: SHIPPED | OUTPATIENT
Start: 2025-06-03

## 2025-06-10 RX ORDER — METFORMIN HYDROCHLORIDE 500 MG/1
TABLET, EXTENDED RELEASE ORAL
Qty: 360 TABLET | Refills: 0 | Status: SHIPPED | OUTPATIENT
Start: 2025-06-10

## 2025-07-01 DIAGNOSIS — E11.21 TYPE 2 DIABETES MELLITUS WITH DIABETIC NEPHROPATHY, WITHOUT LONG-TERM CURRENT USE OF INSULIN (HCC): ICD-10-CM

## 2025-07-04 LAB
ALBUMIN SERPL-MCNC: 4.2 G/DL (ref 3.8–4.8)
ALP SERPL-CCNC: 100 IU/L (ref 44–121)
ALT SERPL-CCNC: 16 IU/L (ref 0–44)
AST SERPL-CCNC: 21 IU/L (ref 0–40)
BILIRUB SERPL-MCNC: 0.4 MG/DL (ref 0–1.2)
BUN SERPL-MCNC: 18 MG/DL (ref 8–27)
BUN/CREAT SERPL: 14 (ref 10–24)
CALCIUM SERPL-MCNC: 9.2 MG/DL (ref 8.6–10.2)
CHLORIDE SERPL-SCNC: 104 MMOL/L (ref 96–106)
CO2 SERPL-SCNC: 23 MMOL/L (ref 20–29)
CREAT SERPL-MCNC: 1.25 MG/DL (ref 0.76–1.27)
EGFRCR SERPLBLD CKD-EPI 2021: 62 ML/MIN/1.73
GLOBULIN SER CALC-MCNC: 2.3 G/DL (ref 1.5–4.5)
GLUCOSE SERPL-MCNC: 69 MG/DL (ref 70–99)
POTASSIUM SERPL-SCNC: 4.6 MMOL/L (ref 3.5–5.2)
PROT SERPL-MCNC: 6.5 G/DL (ref 6–8.5)
SODIUM SERPL-SCNC: 141 MMOL/L (ref 134–144)

## 2025-07-08 LAB
EST. AVERAGE GLUCOSE BLD GHB EST-MCNC: 140 MG/DL
HBA1C MFR BLD: 6.5 %HB

## 2025-07-11 RX ORDER — PIOGLITAZONE 30 MG/1
TABLET ORAL
Qty: 90 TABLET | Refills: 1 | Status: SHIPPED | OUTPATIENT
Start: 2025-07-11

## 2025-08-14 RX ORDER — DAPAGLIFLOZIN 10 MG/1
10 TABLET, FILM COATED ORAL DAILY
Qty: 90 TABLET | Refills: 1 | Status: ACTIVE | OUTPATIENT
Start: 2025-08-14

## (undated) DEVICE — TOWEL 4 PLY TISS 19X30 SUE WHT

## (undated) DEVICE — 3M™ TEGADERM™ TRANSPARENT FILM DRESSING FRAME STYLE, 1624W, 2-3/8 IN X 2-3/4 IN (6 CM X 7 CM), 100/CT 4CT/CASE: Brand: 3M™ TEGADERM™

## (undated) DEVICE — SYR 3ML LL TIP 1/10ML GRAD --

## (undated) DEVICE — NEONATAL-ADULT SPO2 SENSOR: Brand: NELLCOR

## (undated) DEVICE — SOLIDIFIER MEDC 1200ML -- CONVERT TO 356117

## (undated) DEVICE — BASIN EMSIS 16OZ GRAPHITE PLAS KID SHP MOLD GRAD FOR ORAL

## (undated) DEVICE — SNARE ENDOSCP M L240CM W27MM SHTH DIA2.4MM CHN 2.8MM OVL

## (undated) DEVICE — Device

## (undated) DEVICE — CATHETER IV 20GA L1.16IN OD1.0414-1.1176MM ID0.762-0.8382MM

## (undated) DEVICE — CATH IV AUTOGRD BC PNK 20GA 25 -- INSYTE

## (undated) DEVICE — TIP SUCT TRNSPAR RIB SURF STD BLB RIG NVENT W/ 5IN1 CONN DYND50138] MEDLINE INDUSTRIES INC]

## (undated) DEVICE — NEEDLE HYPO 18GA L1.5IN PNK S STL HUB POLYPR SHLD REG BVL

## (undated) DEVICE — CUFF BLD PRSS AD CLTH SGL TB W/ BAYNT CONN ROUNDED CORNER

## (undated) DEVICE — CONTAINER SPEC 20 ML LID NEUT BUFF FORMALIN 10 % POLYPR STS

## (undated) DEVICE — 1200 GUARD II KIT W/5MM TUBE W/O VAC TUBE: Brand: GUARDIAN

## (undated) DEVICE — ENDOSCOPIC KIT COMPLIANCE ENDOKIT

## (undated) DEVICE — TRAP,MUCUS SPECIMEN, 80CC: Brand: MEDLINE

## (undated) DEVICE — SET GRAV CK VLV NEEDLESS ST 3 GANGED 4WAY STPCOCK HI FLO 10

## (undated) DEVICE — FORCEPS BX L160CM DIA8MM GRSP DISECT CUP TIP NONLOCKING ROT

## (undated) DEVICE — KENDALL DL ECG CABLE AND LEAD WIRE SYSTEM, 3-LEAD, SINGLE PATIENT USE: Brand: KENDALL

## (undated) DEVICE — SOLUTION LACTATED RINGERS INJECTION USP

## (undated) DEVICE — ELECTRODE PT RET AD L9FT HI MOIST COND ADH HYDRGEL CORDED

## (undated) DEVICE — NON-REM POLYHESIVE PATIENT RETURN ELECTRODE: Brand: VALLEYLAB

## (undated) DEVICE — BITE BLK ENDOSCP AD 54FR GRN POLYETH ENDOSCP W STRP SLD

## (undated) DEVICE — SET ADMIN 16ML TBNG L100IN 2 Y INJ SITE IV PIGGY BK DISP

## (undated) DEVICE — SYR 10ML LUER LOK 1/5ML GRAD --

## (undated) DEVICE — ENDO CARRY-ON PROCEDURE KIT INCLUDES ENZYMATIC SPONGE, GAUZE, BIOHAZARD LABEL, TRAY, LUBRICANT, DIRTY SCOPE LABEL, WATER LABEL, TRAY, DRAWSTRING PAD, AND DEFENDO 4-PIECE KIT.: Brand: ENDO CARRY-ON PROCEDURE KIT

## (undated) DEVICE — BITEBLOCK 54FR W/ DENT RIM BLOX

## (undated) DEVICE — Z DISCONTINUED PER MEDLINE LINE GAS SAMPLING O2/CO2 LNG AD 13 FT NSL W/ TBNG FILTERLINE

## (undated) DEVICE — FORCEPS BX L240CM JAW DIA2.4MM ORNG L CAP W/ NDL DISP RAD

## (undated) DEVICE — SYSTEM REPROC CBL 3 LD DISPOSABLE

## (undated) DEVICE — IV START KIT: Brand: MEDLINE

## (undated) DEVICE — CONTINU-FLO SOLUTION SET, 2 INJECTION SITES, MALE LUER LOCK ADAPTER WITH RETRACTABLE COLLAR, LARGE BORE STOPCOCK WITH ROTATING MALE LUER LOCK EXTENSION SET, 2 INJECTION SITES, MALE LUER LOCK ADAPTER WITH RETRACTABLE COLLAR: Brand: INTERLINK/CONTINU-FLO

## (undated) DEVICE — STRAINER URIN CALC RNL MSH -- CONVERT TO ITEM 357634

## (undated) DEVICE — SNARE ENDOSCP POLYP MED 2.4 MM 240 CM 27 MM 2.8 MM SHT SENS

## (undated) DEVICE — ELECTRODE,RADIOTRANSLUCENT,FOAM,5PK: Brand: MEDLINE

## (undated) DEVICE — BAG SPEC BIOHZRD 10 X 10 IN --